# Patient Record
Sex: MALE | Race: WHITE | NOT HISPANIC OR LATINO | URBAN - METROPOLITAN AREA
[De-identification: names, ages, dates, MRNs, and addresses within clinical notes are randomized per-mention and may not be internally consistent; named-entity substitution may affect disease eponyms.]

---

## 2020-01-15 ENCOUNTER — INPATIENT (INPATIENT)
Facility: HOSPITAL | Age: 25
LOS: 1 days | DRG: 824 | End: 2020-01-17
Attending: INTERNAL MEDICINE | Admitting: INTERNAL MEDICINE
Payer: COMMERCIAL

## 2020-01-15 VITALS
RESPIRATION RATE: 20 BRPM | HEART RATE: 111 BPM | HEIGHT: 71 IN | TEMPERATURE: 98 F | OXYGEN SATURATION: 97 % | SYSTOLIC BLOOD PRESSURE: 143 MMHG | DIASTOLIC BLOOD PRESSURE: 92 MMHG | WEIGHT: 207.23 LBS

## 2020-01-15 DIAGNOSIS — J98.59 OTHER DISEASES OF MEDIASTINUM, NOT ELSEWHERE CLASSIFIED: ICD-10-CM

## 2020-01-15 DIAGNOSIS — Z29.9 ENCOUNTER FOR PROPHYLACTIC MEASURES, UNSPECIFIED: ICD-10-CM

## 2020-01-15 DIAGNOSIS — Z91.89 OTHER SPECIFIED PERSONAL RISK FACTORS, NOT ELSEWHERE CLASSIFIED: ICD-10-CM

## 2020-01-15 LAB
% ALBUMIN: 53.2 % — SIGNIFICANT CHANGE UP
% ALPHA 1: 5.7 % — SIGNIFICANT CHANGE UP
% ALPHA 2: 10.5 % — SIGNIFICANT CHANGE UP
% BETA: 13.1 % — SIGNIFICANT CHANGE UP
% GAMMA: 17.5 % — SIGNIFICANT CHANGE UP
ALBUMIN SERPL ELPH-MCNC: 4.1 G/DL — SIGNIFICANT CHANGE UP (ref 3.6–5.5)
ALBUMIN SERPL ELPH-MCNC: 4.5 G/DL — SIGNIFICANT CHANGE UP (ref 3.3–5)
ALBUMIN/GLOB SERPL ELPH: 1.1 RATIO — SIGNIFICANT CHANGE UP
ALP SERPL-CCNC: 118 U/L — SIGNIFICANT CHANGE UP (ref 40–120)
ALPHA1 GLOB SERPL ELPH-MCNC: 0.4 G/DL — SIGNIFICANT CHANGE UP (ref 0.1–0.4)
ALPHA2 GLOB SERPL ELPH-MCNC: 0.8 G/DL — SIGNIFICANT CHANGE UP (ref 0.5–1)
ALT FLD-CCNC: 56 U/L — HIGH (ref 10–45)
ANION GAP SERPL CALC-SCNC: 12 MMOL/L — SIGNIFICANT CHANGE UP (ref 5–17)
APPEARANCE UR: CLEAR — SIGNIFICANT CHANGE UP
AST SERPL-CCNC: 54 U/L — HIGH (ref 10–40)
B-GLOBULIN SERPL ELPH-MCNC: 1 G/DL — SIGNIFICANT CHANGE UP (ref 0.5–1)
BASOPHILS # BLD AUTO: 0 K/UL — SIGNIFICANT CHANGE UP (ref 0–0.2)
BASOPHILS NFR BLD AUTO: 0 % — SIGNIFICANT CHANGE UP (ref 0–2)
BILIRUB SERPL-MCNC: 0.3 MG/DL — SIGNIFICANT CHANGE UP (ref 0.2–1.2)
BILIRUB UR-MCNC: NEGATIVE — SIGNIFICANT CHANGE UP
BUN SERPL-MCNC: 13 MG/DL — SIGNIFICANT CHANGE UP (ref 7–23)
CALCIUM SERPL-MCNC: 9.8 MG/DL — SIGNIFICANT CHANGE UP (ref 8.4–10.5)
CHLORIDE SERPL-SCNC: 103 MMOL/L — SIGNIFICANT CHANGE UP (ref 96–108)
CO2 SERPL-SCNC: 27 MMOL/L — SIGNIFICANT CHANGE UP (ref 22–31)
COLOR SPEC: YELLOW — SIGNIFICANT CHANGE UP
CREAT SERPL-MCNC: 0.75 MG/DL — SIGNIFICANT CHANGE UP (ref 0.5–1.3)
CRP SERPL-MCNC: 0.58 MG/DL — HIGH (ref 0–0.4)
DIFF PNL FLD: NEGATIVE — SIGNIFICANT CHANGE UP
EOSINOPHIL # BLD AUTO: 0.09 K/UL — SIGNIFICANT CHANGE UP (ref 0–0.5)
EOSINOPHIL NFR BLD AUTO: 1.7 % — SIGNIFICANT CHANGE UP (ref 0–6)
ERYTHROCYTE [SEDIMENTATION RATE] IN BLOOD: 35 MM/HR — HIGH
FLU A RESULT: SIGNIFICANT CHANGE UP
FLU A RESULT: SIGNIFICANT CHANGE UP
FLUAV AG NPH QL: SIGNIFICANT CHANGE UP
FLUBV AG NPH QL: SIGNIFICANT CHANGE UP
GAMMA GLOBULIN: 1.3 G/DL — SIGNIFICANT CHANGE UP (ref 0.6–1.6)
GIANT PLATELETS BLD QL SMEAR: PRESENT — SIGNIFICANT CHANGE UP
GLUCOSE SERPL-MCNC: 96 MG/DL — SIGNIFICANT CHANGE UP (ref 70–99)
GLUCOSE UR QL: NEGATIVE — SIGNIFICANT CHANGE UP
HCT VFR BLD CALC: 43.9 % — SIGNIFICANT CHANGE UP (ref 39–50)
HGB BLD-MCNC: 14 G/DL — SIGNIFICANT CHANGE UP (ref 13–17)
KETONES UR-MCNC: NEGATIVE — SIGNIFICANT CHANGE UP
LEUKOCYTE ESTERASE UR-ACNC: NEGATIVE — SIGNIFICANT CHANGE UP
LYMPHOCYTES # BLD AUTO: 0.32 K/UL — LOW (ref 1–3.3)
LYMPHOCYTES # BLD AUTO: 6.1 % — LOW (ref 13–44)
MACROCYTES BLD QL: SLIGHT — SIGNIFICANT CHANGE UP
MANUAL SMEAR VERIFICATION: SIGNIFICANT CHANGE UP
MCHC RBC-ENTMCNC: 27.4 PG — SIGNIFICANT CHANGE UP (ref 27–34)
MCHC RBC-ENTMCNC: 31.9 GM/DL — LOW (ref 32–36)
MCV RBC AUTO: 85.9 FL — SIGNIFICANT CHANGE UP (ref 80–100)
MICROCYTES BLD QL: SLIGHT — SIGNIFICANT CHANGE UP
MONOCYTES # BLD AUTO: 0.28 K/UL — SIGNIFICANT CHANGE UP (ref 0–0.9)
MONOCYTES NFR BLD AUTO: 5.3 % — SIGNIFICANT CHANGE UP (ref 2–14)
NEUTROPHILS # BLD AUTO: 4.53 K/UL — SIGNIFICANT CHANGE UP (ref 1.8–7.4)
NEUTROPHILS NFR BLD AUTO: 86 % — HIGH (ref 43–77)
NITRITE UR-MCNC: NEGATIVE — SIGNIFICANT CHANGE UP
OVALOCYTES BLD QL SMEAR: SLIGHT — SIGNIFICANT CHANGE UP
PH UR: 6 — SIGNIFICANT CHANGE UP (ref 5–8)
PLAT MORPH BLD: ABNORMAL
PLATELET # BLD AUTO: 200 K/UL — SIGNIFICANT CHANGE UP (ref 150–400)
POTASSIUM SERPL-MCNC: 4.2 MMOL/L — SIGNIFICANT CHANGE UP (ref 3.5–5.3)
POTASSIUM SERPL-SCNC: 4.2 MMOL/L — SIGNIFICANT CHANGE UP (ref 3.5–5.3)
PROT PATTERN SERPL ELPH-IMP: SIGNIFICANT CHANGE UP
PROT SERPL-MCNC: 7.7 G/DL — SIGNIFICANT CHANGE UP (ref 6–8.3)
PROT SERPL-MCNC: 7.7 G/DL — SIGNIFICANT CHANGE UP (ref 6–8.3)
PROT SERPL-MCNC: 8.3 G/DL — SIGNIFICANT CHANGE UP (ref 6–8.3)
PROT UR-MCNC: NEGATIVE MG/DL — SIGNIFICANT CHANGE UP
RBC # BLD: 5.11 M/UL — SIGNIFICANT CHANGE UP (ref 4.2–5.8)
RBC # FLD: 12.7 % — SIGNIFICANT CHANGE UP (ref 10.3–14.5)
RBC BLD AUTO: ABNORMAL
RSV RESULT: SIGNIFICANT CHANGE UP
RSV RNA RESP QL NAA+PROBE: SIGNIFICANT CHANGE UP
SODIUM SERPL-SCNC: 142 MMOL/L — SIGNIFICANT CHANGE UP (ref 135–145)
SP GR SPEC: <=1.005 — SIGNIFICANT CHANGE UP (ref 1–1.03)
SPHEROCYTES BLD QL SMEAR: SLIGHT — SIGNIFICANT CHANGE UP
UROBILINOGEN FLD QL: 0.2 E.U./DL — SIGNIFICANT CHANGE UP
VARIANT LYMPHS # BLD: 0.9 % — SIGNIFICANT CHANGE UP (ref 0–6)
WBC # BLD: 5.27 K/UL — SIGNIFICANT CHANGE UP (ref 3.8–10.5)
WBC # FLD AUTO: 5.27 K/UL — SIGNIFICANT CHANGE UP (ref 3.8–10.5)

## 2020-01-15 PROCEDURE — 76942 ECHO GUIDE FOR BIOPSY: CPT | Mod: 26

## 2020-01-15 PROCEDURE — 74177 CT ABD & PELVIS W/CONTRAST: CPT | Mod: 26

## 2020-01-15 PROCEDURE — 88342 IMHCHEM/IMCYTCHM 1ST ANTB: CPT | Mod: 26

## 2020-01-15 PROCEDURE — 99285 EMERGENCY DEPT VISIT HI MDM: CPT

## 2020-01-15 PROCEDURE — 88173 CYTOPATH EVAL FNA REPORT: CPT | Mod: 26

## 2020-01-15 PROCEDURE — 38505 NEEDLE BIOPSY LYMPH NODES: CPT

## 2020-01-15 PROCEDURE — 71260 CT THORAX DX C+: CPT | Mod: 26

## 2020-01-15 PROCEDURE — 88341 IMHCHEM/IMCYTCHM EA ADD ANTB: CPT | Mod: 26

## 2020-01-15 PROCEDURE — 71046 X-RAY EXAM CHEST 2 VIEWS: CPT | Mod: 26

## 2020-01-15 PROCEDURE — 72126 CT NECK SPINE W/DYE: CPT | Mod: 26

## 2020-01-15 PROCEDURE — 93010 ELECTROCARDIOGRAM REPORT: CPT | Mod: 59

## 2020-01-15 PROCEDURE — 88305 TISSUE EXAM BY PATHOLOGIST: CPT | Mod: 26

## 2020-01-15 PROCEDURE — 72040 X-RAY EXAM NECK SPINE 2-3 VW: CPT | Mod: 26

## 2020-01-15 PROCEDURE — 88307 TISSUE EXAM BY PATHOLOGIST: CPT | Mod: 26

## 2020-01-15 PROCEDURE — 99222 1ST HOSP IP/OBS MODERATE 55: CPT

## 2020-01-15 RX ORDER — SODIUM CHLORIDE 9 MG/ML
3 INJECTION INTRAMUSCULAR; INTRAVENOUS; SUBCUTANEOUS ONCE
Refills: 0 | Status: COMPLETED | OUTPATIENT
Start: 2020-01-15 | End: 2020-01-15

## 2020-01-15 RX ORDER — ENOXAPARIN SODIUM 100 MG/ML
40 INJECTION SUBCUTANEOUS EVERY 24 HOURS
Refills: 0 | Status: DISCONTINUED | OUTPATIENT
Start: 2020-01-15 | End: 2020-01-17

## 2020-01-15 RX ORDER — IOHEXOL 300 MG/ML
30 INJECTION, SOLUTION INTRAVENOUS ONCE
Refills: 0 | Status: COMPLETED | OUTPATIENT
Start: 2020-01-15 | End: 2020-01-15

## 2020-01-15 RX ORDER — SODIUM CHLORIDE 9 MG/ML
1000 INJECTION INTRAMUSCULAR; INTRAVENOUS; SUBCUTANEOUS
Refills: 0 | Status: DISCONTINUED | OUTPATIENT
Start: 2020-01-15 | End: 2020-01-15

## 2020-01-15 RX ADMIN — IOHEXOL 30 MILLILITER(S): 300 INJECTION, SOLUTION INTRAVENOUS at 10:45

## 2020-01-15 RX ADMIN — ENOXAPARIN SODIUM 40 MILLIGRAM(S): 100 INJECTION SUBCUTANEOUS at 17:33

## 2020-01-15 RX ADMIN — SODIUM CHLORIDE 3 MILLILITER(S): 9 INJECTION INTRAMUSCULAR; INTRAVENOUS; SUBCUTANEOUS at 10:36

## 2020-01-15 RX ADMIN — SODIUM CHLORIDE 125 MILLILITER(S): 9 INJECTION INTRAMUSCULAR; INTRAVENOUS; SUBCUTANEOUS at 10:46

## 2020-01-15 NOTE — CONSULT NOTE ADULT - SUBJECTIVE AND OBJECTIVE BOX
24M sent in to ER for work up of neck symptoms. Pt reports 10 month history of vague, "muscle tightness" to his neck that has not improved. Pt says he has had the pain since April 2019 and been to 2-3 doctors for it in the past with no diagnosis. Symptoms include left arm pain and "numbness" that comes and goes at times. Recently pt has developed a low grade temp, dry cough, and hoarseness over the past 2-3 weeks which he assumed to be a cold/URI and these symptoms are not improving. Pt saw Dr. Langley for his continued neck pain last week and was sent for MRI that revealed MRI of the cervical spine which was consistent with malignant infiltration or inhomogeneous Infiltration of the bones of the cervical spine.    PE: Comfortable, sitting in ER chair.  +Fullness to cervical spine left > greater than right with lymphadenopathy. Decreased ROM to cervical spine secondary to pain.AIN/PIN/U nerves intact to motor BUE. Wrist flex/ext intact BUE. Sensation intact to M/R/U/Msk/Ax nerves and equal BUE. Cap refill brisk. Skin warm and well perfused. Radial pulses palpable.     Labs pending  CT Chest/Abd/Pelvis/Neck with PO and IV contrast pending  MRI from outpatient being uploaded  CXR, Cervical spine XR show mediastinal mass, for further eval    A/P: 24M presenting with 10 months of neck pain accompanied with neck mass, and 3 week hx of hoarsness and dry cough   - Medical concern for malignancy- Pt being admitted to medicine for further Med/Onc work up  - Discussed with Ortho Spine Attg Dr. Langley, will follow patient and review studies as they return  - CT, Electrophoresis studies, MRI upload pending.  - Ortho [phone]

## 2020-01-15 NOTE — ED PROVIDER NOTE - CLINICAL SUMMARY MEDICAL DECISION MAKING FREE TEXT BOX
23 yo M with no pmhx who presents for further evaluation following MRI yesterday. Pt is hemodynamically stable, with mild tachycardia. L supraclavicular mass appreciated, firm, nontender. Hoarseness and dry cough present for the past few weeks as well. Will admit for further work up.  - CBC, CMP, CRP, ESR, SPEP   - CT A/P with oral and IV contrast, CT chest with IV contrast, CT cervical spine with IV contrast  - chest xray, cervical spine xray

## 2020-01-15 NOTE — H&P ADULT - HISTORY OF PRESENT ILLNESS
24M with no PMH presents with neck pain which started last sprain. The pain went away but returned this fall  in which he also developed hoarseness.  The patient was well until last spring when he developed neck pain which he initially attributed to sleeping the wrong way at night on an uncomfortable pillow.  The symptoms partially abated but by the fall the patient had developed an episode of hoarseness which lasted approximately 2 days.  Since Christmas holidays the patient for the last 2 weeks has been hoarse And this had limited range of motion and discomfort in the cervical spine. The patient also developed a dry cough since that time.  The patient went to see an orthopedic surgeon who ordered an MRI of the cervical spine which was consistent with malignant infiltration or at least Inhomogeneous Infiltration of the bones of the cervical spine.  The patient was recommended to go this morning to the emergency room.  There was no evidence for cord compression on the cervical spine MRI.    The patient has been a healthy person all his life although he admits to having problems with being overweight.  The patient has recently lost weight which was felt to be intentional.  His family history is noncontributory.    The patient's vital signs are within normal limits.  Examination of the head ears eyes nose and throat demonstrates no abnormality.  Examination of the neck reveals lymphedema of the anterior lower neck and palpable 2 x 3 cm lymphadenopathy in the left supraclavicular area. The lung sounds are coarse in the left lung field although there are no forced expiratory wheezes bilaterally.  The heart rate is regular with no auscultatory evidence for murmur, rub or gallop.  The patient has excessive truncal obesity but there is no palpable or percussible hepatomegaly or splenomegaly.  The patient has no infraclavicular, axillary, epitrochlear, periumbilical or inguinal lymphadenopathy.  The patient has no chronic venous stasis changes in the lower extremities.  Neurologically there is no gross abnormality and higher cortical, cerebellar, motor or sensory functions.  The patient has no rash. 24M with no PMH presenting with 10 months of neck/left shoulder pain accompanied with neck mass, and 3 week history of hoarseness and dry cough. He says that the pain is dull and worsens on change of position. It starts at the neck and spreads down to his left shoulder. He denies any numbness, tingling, or loss of strength and sensation in his arm and hand. He also notes chest tightness especially over his left upper chest. He rates the pain as 6/10 in severity and says it comes and goes and sometimes keeps him up at night. He has seen multiple doctors for his symptoms including orthopedics and his primary care. His symptoms were controlled by muscle relaxers as well as aleve, Advil and Tylenol which he notes that he has been taking one of each daily in the last 3 weeks. He reports that it helps alleviate his pain for a short time. His hoarseness and cough which started a few weeks ago was attributed to a URI but his symptoms didn't resolve. He says his cough has become less frequent and his hoarseness still persists. He denies any dysphagia or odynophagia.  The patient went to see an orthopedic surgeon last week who ordered an MRI of the cervical spine which was consistent with malignant infiltration and homogeneous Infiltration of the bones of the cervical spine with no evidence of cord compression. He has family history of malignancies in his paternal grandmother and aunt with origins he cannot recall. In the past few weeks, he reports low grade fevers, intermittent chills and night sweats with no change in appetite or unintentional weight loss.     ED Course:  Vitals: T 97.9F  /92 RR 20 O2 98% RA  Labs: Wbc 5.27 Hb 14.0 Plt 200 AST 54 ALT 56 ESR 35 CRP 0.58 Flu neg, RSV neg  CXR Sclerosis of the C7 vertebral body with irregularity of the superior endplate, left upper lobe pulmonary nodule and fullness in the left hilar region  Given 1L NS in the ED 24M with no PMH presenting with 10 months of neck/left shoulder pain accompanied with neck mass, and 3 week history of hoarseness and dry cough. He says that the pain is dull and worsens on change of position. It starts at the neck and spreads down to his left shoulder. He denies any numbness, tingling, or loss of strength and sensation in his arm and hand. He also notes chest tightness especially over his left upper chest. He rates the pain as 6/10 in severity and says it comes and goes and sometimes keeps him up at night. He has seen multiple doctors for his symptoms including orthopedics and his primary care. His symptoms were controlled by muscle relaxers as well as aleve, Advil and Tylenol which he notes that he has been taking one of each daily in the last 3 weeks. He reports that it helps alleviate his pain for a short time. His hoarseness and cough which started a few weeks ago was attributed to a URI but his symptoms didn't resolve. He says his cough has become less frequent and his hoarseness still persists. He denies any dysphagia or odynophagia.  The patient went to see an orthopedic surgeon last week who ordered an MRI of the cervical spine which was consistent with malignant infiltration and homogeneous Infiltration of the bones of the cervical spine with no evidence of cord compression. He has family history of malignancies in his paternal grandmother and aunt with origins he cannot recall. In the past few weeks, he reports low grade fevers, intermittent chills and night sweats with no change in appetite or unintentional weight loss.     ED Course:  Vitals: T 97.9F  /92 RR 20 O2 98% RA  Labs: Wbc 5.27 Hb 14.0 Plt 200 AST 54 ALT 56 ESR 35 CRP 0.58 Flu neg, RSV neg  CXR Sclerosis of the C7 vertebral body with irregularity of the superior endplate, left upper lobe pulmonary nodule and fullness in the left hilar region  CT A/P: Destructive and presumed metastatic lesion of C7 with mild height loss. Marrow involvement of both C7 and T1 with ventral cortical erosion of C7-T2 from prevertebral tumor. Epidural and neural foraminal extension of tumor with partial encasement of the left vertebral artery. Multiple liver lesions. Enlarged portacaval, periportal and upper retroperitoneal nodes. Malignant tumor noted in the anterior mediastinum. Trace pericardial effusion. Metastatic left supraclavicular adenopathy. Bony metastases.  Given 1L NS in the ED

## 2020-01-15 NOTE — CONSULT NOTE ADULT - ASSESSMENT
The patient likely has a malignant process and with the auscultatory changes in the left chest, hoarseness and infiltrative cervical spine disease I suspect the patient has primary lung cancer, thymoma, Hodgkin's disease when on the skin's lymphoma.

## 2020-01-15 NOTE — ED CLERICAL - NS ED CLERK NOTE PRE-ARRIVAL INFORMATION; ADDITIONAL PRE-ARRIVAL INFORMATION
00 M- Low,  Guillermo Nichols Pt of Dr. Ba coming to the ER with Bone dz - leuk- need chest ct Abd & Pelvic -serum electro-labs Upload MRI to PACS. Admit to Dr. Ba . Call Ortho when patient gets here Dr. Talley (MINERVA OROZCO)

## 2020-01-15 NOTE — CONSULT NOTE ADULT - ATTENDING COMMENTS
The patient should have a cancer blood panel consisting of a CEA antigen, alpha-fetoprotein, beta subunit hCG, .  CT scan of the chest abdomen and pelvis with oral and IV contrast.  Ultrasound directed biopsy of the left supraclavicular lymph node by interventional radiology.

## 2020-01-15 NOTE — CONSULT NOTE ADULT - SUBJECTIVE AND OBJECTIVE BOX
The patient was well until last spring when he developed neck pain which he initially attributed to sleeping the wrong way at night on an uncomfortable pillow.  The symptoms partially abated but by the fall the patient had developed an episode of hoarseness which lasted approximately 2 days.  Since Christmas holidays the patient for the last 2 weeks has been hoarse And this had limited range of motion and discomfort in the cervical spine. The patient also developed a dry cough since that time.  The patient went to see an orthopedic surgeon who ordered an MRI of the cervical spine which was consistent with malignant infiltration or at least Inhomogeneous Infiltration of the bones of the cervical spine.  The patient was recommended to go this morning to the emergency room.  There was no evidence for cord compression on the cervical spine MRI.    The patient has been a healthy person all his life although he admits to having problems with being overweight.  The patient has recently lost weight which was felt to be intentional.  His family history is noncontributory.    The patient's vital signs are within normal limits.  Examination of the head ears eyes nose and throat demonstrates no abnormality.  Examination of the neck reveals lymphedema of the anterior lower neck and palpable 2 x 3 cm lymphadenopathy in the left supraclavicular area. The lung sounds are coarse in the left lung field although there are no forced expiratory wheezes bilaterally.  The heart rate is regular with no auscultatory evidence for murmur, rub or gallop.  The patient has excessive truncal obesity but there is no palpable or percussible hepatomegaly or splenomegaly.  The patient has no infraclavicular, axillary, epitrochlear, periumbilical or inguinal lymphadenopathy.  The patient has no chronic venous stasis changes in the lower extremities.  Neurologically there is no gross abnormality and higher cortical, cerebellar, motor or sensory functions.  The patient has no rash.

## 2020-01-15 NOTE — H&P ADULT - PROBLEM SELECTOR PLAN 1
auscultatory changes in the left chest, hoarseness and infiltrative cervical spine disease   Differentials include primary lung cancer, thymoma, Hodgkin's disease  CT scan of the chest abdomen and pelvis with oral and IV contrast.  Ultrasound directed biopsy of the left supraclavicular lymph node by interventional radiology.   CEA antigen, alpha-fetoprotein, beta subunit hCG, . 10 month history of neck and left shoulder pain, now with new onset dry cough a hoarseness. Neck reveals lymphedema of the anterior lower neck and palpable 2 x 3 cm lymphadenopathy in the left supraclavicular area. Lungs clear to auscultation. Infiltrative cervical spine disease seen on MRI (1/13/2020) and CXR. Differentials include primary lung cancer, thymoma, Hodgkin's disease  - f/u CT scan of the chest abdomen and pelvis with oral and IV contrast.  - pending ultrasound directed biopsy of the left supraclavicular lymph node by interventional radiology.   - f/u CEA antigen, alpha-fetoprotein, beta subunit hCG, .  - followed by Dr Ba 10 month history of neck and left shoulder pain, now with new onset dry cough a hoarseness. Neck reveals lymphedema of the anterior lower neck and palpable 2 x 3 cm lymphadenopathy in the left supraclavicular area. Lungs clear to auscultation. Infiltrative cervical spine disease seen on MRI (1/13/2020) and CXR. Reports low grade fevers, chills, and night sweats intermittently within the last few weeks. Denied loss of appetite or weight loss. Has family history of malignancy. Differentials include primary lung cancer, thymoma, Hodgkin's disease  - f/u CT scan of the chest abdomen and pelvis with oral and IV contrast.  - pending ultrasound directed biopsy of the left supraclavicular lymph node by interventional radiology.   - f/u CEA antigen, alpha-fetoprotein, beta subunit hCG, .  - followed by Dr Ba 10 month history of neck and left shoulder pain, now with new onset dry cough a hoarseness. Neck reveals lymphedema of the anterior lower neck and palpable 2 x 3 cm lymphadenopathy in the left supraclavicular area. Lungs clear to auscultation. Infiltrative cervical spine disease seen on MRI (1/13/2020) and CXR. Reports low grade fevers, chills, and night sweats intermittently within the last few weeks. Denied loss of appetite or weight loss. Has family history of malignancy. Differentials include primary lung cancer, thymoma, Hodgkin's disease  - CT A/P: Destructive and presumed metastatic lesion of C7 with mild height loss. Marrow involvement of both C7 and T1 with ventral cortical erosion of C7-T2 from prevertebral tumor. Epidural and neural foraminal extension of tumor with partial encasement of the left vertebral artery. Multiple liver lesions. Enlarged portacaval, periportal and upper retroperitoneal nodes. Malignant tumor noted in the anterior mediastinum. Trace pericardial effusion. Metastatic left supraclavicular adenopathy. Bony metastases.  - pending ultrasound directed biopsy of the left supraclavicular lymph node by interventional radiology.   - f/u CEA antigen, alpha-fetoprotein, beta subunit hCG, .  - followed by Dr Ba

## 2020-01-15 NOTE — ED PROVIDER NOTE - NS_EDPROVIDERDISPOUSERTYPE_ED_A_ED
Medical Students Attestation (For Medical Student USE Only).../Attending Attestation (For Attendings USE Only)...

## 2020-01-15 NOTE — ED PROVIDER NOTE - OBJECTIVE STATEMENT
25 yo M with no phmx who presents for further medical evaluation of a 9 mo hx of neck and back pain. Pt reports he had seen orthopedics, who sent him for an MRI, was subsequently told to come to the ED for further work up based on MRI findings. Reports that pain at this time is 0/10. Throughout this time has been worse 23 yo M with no phmx who presents for further medical evaluation of a 9 mo hx of neck and back pain. Pt reports he had seen orthopedics, who sent him for an MRI, was subsequently told to come to the ED for further work up based on MRI findings. Reports that pain at this time is 0/10. Throughout this time has been worse following prolonged sitting, improved with lying down, heat, NSAIDs. Occasional radiation to L arm. Endorses low grade fevers over the past few weeks, reports mild night sweats which has required him to change his sheets a "few" times over the past few weeks. For the past 2 weeks has had a non-productive cough. Denies any weight changes, chills, CP, SOB.

## 2020-01-15 NOTE — CONSULT NOTE ADULT - ASSESSMENT
Assessment/Plan:  24yM no PMH presenting with 3 weeks of hoarseness in setting of several months of neck pain with exam significant for immobile L TVF, currently admitted for metastatic workup.    - Please obtain CT neck with IV contrast (from aortic arch to skull base, to eval course of recurrent laryngeal nerve)  - ENT will f/u CT neck   - Remainder of care per primary team    [facility] at [phone] with any questions/concerns.

## 2020-01-15 NOTE — ED ADULT TRIAGE NOTE - CHIEF COMPLAINT QUOTE
pt c/o chronic neck and back pain referred by orthopedics for admission. pt reports having an MRI done and " will need further testing" denies numbness, tingling, bowel or urinary incontinence.

## 2020-01-15 NOTE — H&P ADULT - ASSESSMENT
24M with no PMH presenting with 10 months of neck/left shoulder pain accompanied with a neck mass, and 3 week history of hoarseness and dry cough admitted for malignancy workup.

## 2020-01-15 NOTE — H&P ADULT - NSHPLABSRESULTS_GEN_ALL_CORE
LABS:                         14.0   5.27  )-----------( 200      ( 15 Anselmo 2020 10:35 )             43.9     01-15    142  |  103  |  13  ----------------------------<  96  4.2   |  27  |  0.75    Ca    9.8      15 Anselmo 2020 10:35    TPro  8.3  /  Alb  4.5  /  TBili  0.3  /  DBili  x   /  AST  54<H>  /  ALT  56<H>  /  AlkPhos  118  01-15                  RADIOLOGY, EKG & ADDITIONAL TESTS:

## 2020-01-15 NOTE — ED PROVIDER NOTE - ENMT, MLM
Airway patent, Mouth with normal mucosa. Throat has no vesicles, no oropharyngeal exudates and uvula is midline. Hoarse voice.   Firm, painless mass appreciated L supraclavicular.

## 2020-01-15 NOTE — H&P ADULT - NSHPPHYSICALEXAM_GEN_ALL_CORE
Vital Signs Last 12 Hrs  T(F): 97.9 (01-15-20 @ 09:07), Max: 97.9 (01-15-20 @ 09:07)  HR: 111 (01-15-20 @ 09:07) (111 - 111)  BP: 143/92 (01-15-20 @ 09:07) (143/92 - 143/92)  BP(mean): --  RR: 20 (01-15-20 @ 09:07) (20 - 20)  SpO2: 97% (01-15-20 @ 09:07) (97% - 97%)    PHYSICAL EXAM:  Constitutional: NAD, comfortable in bed.  HEENT: NC/AT, PERRLA, EOMI, no conjunctival pallor or scleral icterus, MMM  Neck: Supple, no JVD  Respiratory: CTA B/L. No w/r/r.   Cardiovascular: RRR, normal S1 and S2, no m/r/g.   Gastrointestinal: +BS, soft NTND, no guarding or rebound tenderness, no palpable masses   Extremities: wwp; no cyanosis, clubbing or edema.   Vascular: Pulses equal and strong throughout.   Neurological: AAOx3, no CN deficits, strength and sensation intact throughout.   Skin: No gross skin abnormalities or rashes Vital Signs Last 12 Hrs  T(F): 97.9 (01-15-20 @ 09:07), Max: 97.9 (01-15-20 @ 09:07)  HR: 111 (01-15-20 @ 09:07) (111 - 111)  BP: 143/92 (01-15-20 @ 09:07) (143/92 - 143/92)  BP(mean): --  RR: 20 (01-15-20 @ 09:07) (20 - 20)  SpO2: 97% (01-15-20 @ 09:07) (97% - 97%)    PHYSICAL EXAM:  Constitutional: NAD, comfortable in bed.  HEENT: NC/AT, PERRLA, EOMI, no conjunctival pallor or scleral icterus, MMM  Neck: 2 x 3 cm lymphadenopathy in the left supraclavicular area with no pain on palpation, no JVD  Respiratory: CTA B/L. No w/r/r.   Cardiovascular: RRR, normal S1 and S2, no m/r/g.   Gastrointestinal: +BS, soft NTND, no guarding or rebound tenderness, no palpable masses   Extremities: pain on palpation on the left neck and left shoulder, wwp; no cyanosis, clubbing or edema.   Vascular: Pulses equal and strong throughout.   Neurological: AAOx3, no CN deficits, strength and sensation intact throughout.   MSK:  hypertonic muscle in the left paravertebral region and intercostal on the left anterior chest  Skin: increased redness on the upper chest bilaterally, No gross skin abnormalities or rashes

## 2020-01-15 NOTE — CONSULT NOTE ADULT - NSHPATTENDINGPLANDISCUSS_GEN_ALL_CORE
The patient, the emergency room physicians In the person and orthopedic surgery By telephone and text message.

## 2020-01-15 NOTE — CONSULT NOTE ADULT - SUBJECTIVE AND OBJECTIVE BOX
HPI:   24M with no PMH who presents with 10 months of neck/left shoulder pain accompanied with neck mass, and 3 week history of hoarseness and dry cough. Patient had outpatient MRI which revealed bony lesions suspicious for metastatic disease. In the ED, patient had CXR with mediastinal mass and CT chest c/w malignant tumor in anterior mediastinum, L supraclavicular adenopathy, and bony metastasis. CT ab/pelvis significant for multiple liver lesions. ENT consulted to evaluate hoarseness. Patient reports hoarseness began 3 weeks ago and was associated with dry cough but no drooling or SOB.  +Subjective fevers/chills, +vomiting x1.        Allergies    dairy products (Anaphylaxis)  No Known Drug Allergies    Intolerances        PAST MEDICAL & SURGICAL HISTORY:  No pertinent past medical history  No significant past surgical history      SOCIAL HISTORY:  Tobacco History: Never  ETOH Use: social       FAMILY HISTORY:  Salivary gland malignancy in paternal aunt   Unknown cancer in paternal grandmother     REVIEW OF SYSTEMS: as per HPI    MEDICATIONS:  Antiinfectives:       Hematologic/Anticoagulation:  enoxaparin Injectable 40 milliGRAM(s) SubCutaneous every 24 hours      Pain medications/Neuro:      IV fluids:      Endocrine Medications:       All other standing medications:       All other PRN medications:      Vital Signs Last 24 Hrs  T(C): 36.9 (15 Anselmo 2020 17:26), Max: 36.9 (15 Anselmo 2020 17:26)  T(F): 98.5 (15 Anselmo 2020 17:26), Max: 98.5 (15 Anselmo 2020 17:26)  HR: 88 (15 Anselmo 2020 17:26) (76 - 111)  BP: 124/80 (15 Anselmo 2020 17:26) (124/80 - 143/92)  BP(mean): --  RR: 18 (15 Anselmo 2020 17:26) (18 - 20)  SpO2: 100% (15 Anselmo 2020 17:26) (97% - 100%)    LABS:  CBC-    01-15    142  |  103  |  13  ----------------------------<  96  4.2   |  27  |  0.75    Ca    9.8      15 Anselmo 2020 10:35    TPro  7.7  /  Alb  x   /  TBili  x   /  DBili  x   /  AST  x   /  ALT  x   /  AlkPhos  x   01-15    Coagulation Studies-    Endocrine Panel-  --  --  9.8 mg/dL        PHYSICAL EXAM:    ENT EXAM-   Constitutional: Well-developed, well-nourished.  Hoarse voice.     Head:  normocephalic, atraumatic.   Nose:  Septum intact. Inferior turbinates normal bilateral    MULTISYSTEM EXAM-  Neuro/Psych:  A&O x 3.  Mood stable.    Eyes:  EOMI  Pulm:  No dyspnea, non-labored breathing  Cardiovascular: NSR on monitors   Skin:  No rash or lesions on exposed skin of head/neck      Laryngoscopy Findings:   LARYNGOSCOPY EXAM:     -Verbal consent was obtained from patient prior to procedure.    Indication:    Anesthesia: Afrin spray was applied to the nasal cavities.    Flexible laryngoscopy was performed and revealed the following:    -- Nasopharynx had no mass or exudate.    -- Base of tongue was symmetric and not enlarged.    -- Vallecula was clear    -- Epiglottis, both aryepiglottic folds and both false vocal folds were normal    -- Arytenoids both without edema and erythema     -- L true vocal fold immobile. R TVF mobile.     -- Post cricoid area was clear.    -- Interarytenoid edema was absent    The patient tolerated the procedure well.      RADIOLOGY & ADDITIONAL STUDIES:      EXAM:  CT ABDOMEN AND PELVIS OC IC                          PROCEDURE DATE:  01/15/2020          INTERPRETATION:  CT of the ABDOMEN and PELVIS with intravenous contrast dated 1/15/2020 12:42 PM    INDICATION: bone cancer needs met eval     TECHNIQUE: CT of the abdomen and pelvis was performed. Axial and coronal  and sagittall images were produced and reviewed.    CONTRAST USAGE:  IV contrast: Optiray 350: 100 ml administered; ml discarded.  Oral contrast: administered.    PRIOR STUDIES: None.    FINDINGS: Images of the lower chest demonstrate trace pericardial effusion.    The liver is normal in size. Multiple liver inhomogeneous liver lesions including 2.6 x 2.2 cm segment two, coalescent lesion measuring 4.8 x 3.6 cm segment six. There are other smaller lesions in the right lobe measuring 1.9 x 2.1 cm, 2.0 x 1.1 cm, 1.2 x 1.0 cm..  No radiopaque stones are seen in the gallbladder.  The pancreas is normal in appearance.  No splenic abnormalities are seen. Enlarged inhomogeneous periportal and portacaval nodes up to 1.9 cm diameter.    The adrenal glands are unremarkable. The kidneys are normal in appearance.        No abdominal aortic aneurysm is seen. Slightly enlarged high retroperitoneal nodes including  paraaortic, interaortocaval , preaortic. Small mesenteric nodes.    Evaluation of the bowel demonstrates that the ingested oral contrast material has reached the hepatic flexure of colon. No bowel obstruction. Normal appendix.. No ascites is seen.    Images of the pelvis demonstrate the prostate and seminal vesicles to be normal in appearance. The prostate measures 3.5 x 2.5 x 2.3 cm. The bladder is distended but otherwise normal.    Evaluation of the osseous structures demonstrates multiple mixed osteolytic and osteoblastic bone lesions including right ischial tuberosity, right sacral a lower, right posterior ilium, left posterior iliac bone left posterior 11th rib associated with an extrapleural mass.      IMPRESSION: Multiple liver lesions are likely metastases versus lymphoma.  Enlarged portacaval, periportal and upper retroperitoneal nodes.      EXAM:  CT CHEST IC                          PROCEDURE DATE:  01/15/2020          INTERPRETATION:  CT of the CHEST WITH INTRAVENOUS CONTRAST DATED : 1/15/2020 9:19 AM    INDICATION: bone cancer  needs met eval    TECHNIQUE: CT of the chest was performed. Axial and coronal and sagittal  images were produced and reviewed.    CONTRAST USAGE:  IV contrast: Optiray 350: 100 ml administered; ml discarded.      PRIOR STUDIES: None.    FINDINGS: The heart is normal in size.  Trace pericardial effusion is seen.  The great vessels are unremarkable.  No mediastinal  or axillary  lymphadenopathy is seen. 0.9 cm right hilar node. There is a large inhomogeneous solid anterior mediastinal mass measuring 6.6 x 8.4 x 8.1 cm. Superior extension noted with encasement of the left common carotid artery and left subclavian artery. The thyroid gland is unremarkable. Enlarged left supraclavicular inhomogeneous nodes measuring 1.7 x 2.2 x 2.9 cm. Left internal jugular vein not identified. There is occlusion and encasement of the left brachiocephalic vein and probably the proximal left subclavian vein.     Evaluation of the pulmonary parenchyma demonstrates no abnormality.  No pleural effusions are seen.    Evaluation of the osseous structures multiple mixed osteolytic osteoblastic blastic lesions. These include manubrium of sternum associated with periosteal reaction and extra osseous extension, C7 vertebral body, left posterior 11th rib.   Bilateral gynecomastia.      IMPRESSION: Malignant tumor noted in the anterior mediastinum. Differential diagnosis would include lymphoma, malignant germ cell tumor. Trace pericardial effusion. Metastatic left supraclavicular adenopathy. Bony metastases as described above. Also recommend scrotal ultrasound for evaluation of testes.    EXAM:  CT CERVICAL SPINE IC                          PROCEDURE DATE:  01/15/2020          INTERPRETATION:  EXAM:  CT Cervical Spine with Contrast    INDICATION: Bone cancer. Evaluate for metastases.    TECHNIQUE: Multiple axial sections were obtained from the mid orbits to the sternoclavicular joint. Intravenous contrast material was utilized. Sagittal and coronal reformats were obtained from the axial data set. The images were reviewed in soft tissue and bone windows.    CONTRAST: 93 cc Optiray 350    COMPARISON: Outside noncontrast MRI cervical spine 01/13/2020    FINDINGS:     There is pathologic mild compression deformity of the C7 vertebral body that appears heterogeneously sclerotic and lytic on CT with involvement of the posterior elements, especially the left transverse process. There is also slight height loss of the T1 vertebral body with marrow replacement better seen on MR, without additional focus of galen lytic or blastic change on CT in the T1 vertebral body or elsewhere. However, there is clear ventral cortical erosion of C7, T1 and T2. MRI shows prevertebral tumor here and also present in the ventral epidural space, and neural foramina especially on the left with partial encasement of the left vertebral artery. No compression seen on outside MR. Soft tissue assessment on CT is hampered in the presence of broad shoulder body habitus with excessive image noise.     Alignment of the cervical spine shows straightening of lordosis. There is no spondylolisthesis or facet subluxation. Alignment at the craniovertebral junction is similarly normal. Intervertebral discs are preserved in height.    There is partially necrotic lymphadenopathy at the left level IV station and smaller there are asymmetric lymph nodes at left levels level 2 and 3. As more completely evaluated on chest CT, there is a mediastinal mass with great vessel encasement. There is also evidence of left vocal palsy indicating invasion of the left recurrent laryngeal nerve. Destructive mass of the manubrium is partially imaged.      IMPRESSION:    Destructive and presumed metastatic lesion of C7 with mild height loss. Marrow involvement of both C7 and T1 with ventral cortical erosion of C7-T2 from prevertebral tumor seen on outside MR. There is also epidural and neural foraminal extension of tumor with partial encasement of the left vertebral artery. Extraosseous tumor would be better defined with contrast-enhanced MR and/or PET-CT.    No malalignment.    Extraspinal malignancy as seen on same day chest CT, with additional note of left cervical lymphadenopathy and evidence of left vocal palsy.

## 2020-01-15 NOTE — PROGRESS NOTE ADULT - SUBJECTIVE AND OBJECTIVE BOX
PUD FOR DR SOUZA    24M with no PMH presenting with 10 months of neck/left shoulder pain accompanied with neck mass, and 3 week history of hoarseness and dry cough. He says that the pain is dull and worsens on change of position. It starts at the neck and spreads down to his left shoulder. He denies any numbness, tingling, or loss of strength and sensation in his arm and hand. He also notes chest tightness especially over his left upper chest. He rates the pain as 6/10 in severity and says it comes and goes and sometimes keeps him up at night. He has seen multiple doctors for his symptoms including orthopedics and his primary care. His symptoms were controlled by muscle relaxers as well as aleve, Advil and Tylenol which he notes that he has been taking one of each daily in the last 3 weeks. He reports that it helps alleviate his pain for a short time. His hoarseness and cough which started a few weeks ago was attributed to a URI but his symptoms didn't resolve. He says his cough has become less frequent and his hoarseness still persists. He denies any dysphagia or odynophagia.  The patient went to see an orthopedic surgeon last week who ordered an MRI of the cervical spine which was consistent with malignant infiltration and homogeneous Infiltration of the bones of the cervical spine with no evidence of cord compression. He has family history of malignancies in his paternal grandmother and aunt with origins he cannot recall. In the past few weeks, he reports low grade fevers, intermittent chills and night sweats with no change in appetite or unintentional weight loss.     ED Course:  Vitals: T 97.9F  /92 RR 20 O2 98% RA  Labs: Wbc 5.27 Hb 14.0 Plt 200 AST 54 ALT 56 ESR 35 CRP 0.58 Flu neg, RSV neg  CXR Sclerosis of the C7 vertebral body with irregularity of the superior endplate, left upper lobe pulmonary nodule and fullness in the left hilar region  CT A/P: Destructive and presumed metastatic lesion of C7 with mild height loss. Marrow involvement of both C7 and T1 with ventral cortical erosion of C7-T2 from prevertebral tumor. Epidural and neural foraminal extension of tumor with partial encasement of the left vertebral artery. Multiple liver lesions. Enlarged portacaval, periportal and upper retroperitoneal nodes. Malignant tumor noted in the anterior mediastinum. Trace pericardial effusion. Metastatic left supraclavicular adenopathy. Bony metastases.  Given 1L NS in the ED.       Review of Systems:  Other Review of Systems: All other review of systems negative, except as noted in HPI	      Allergies and Intolerances:        Allergies:  	No Known Drug Allergies:   	dairy products: Food, Anaphylaxis    Home Medications:   * Outpatient Medication Status not yet specified    .    Patient History:    Past Medical, Past Surgical, and Family History:  PAST MEDICAL HISTORY:  No pertinent past medical history.     PAST SURGICAL HISTORY:  No significant past surgical history.     Social History:  Social History (marital status, living situation, occupation, tobacco use, alcohol and drug use, and sexual history): Denies smoking, alcohol, drug use. Lives at home with his parents.	     Tobacco Screening:  · Core Measure Site	No	  · Has the patient used tobacco in the past 30 days?	No	    Risk Assessment:    Present on Admission:  Deep Venous Thrombosis	no	  Pulmonary Embolus	no	     Heart Failure:  Does this patient have a history of or has been diagnosed with heart failure? no.    HIV Screen (per [facility], HIV screening must be offered to every individual between ages 13 and 64)	Unable to offer due to clinical condition	       Physical Exam:  Physical Exam: Vital Signs Last 12 Hrs  T(F): 97.9 (01-15-20 @ 09:07), Max: 97.9 (01-15-20 @ 09:07)  HR: 111 (01-15-20 @ 09:07) (111 - 111)  BP: 143/92 (01-15-20 @ 09:07) (143/92 - 143/92)  BP(mean): --  RR: 20 (01-15-20 @ 09:07) (20 - 20)  SpO2: 97% (01-15-20 @ 09:07) (97% - 97%)   PHYSICAL EXAM:  Constitutional: NAD, comfortable in bed.  HEENT: NC/AT, PERRLA, EOMI, no conjunctival pallor or scleral icterus, MMM  Neck: 2 x 3 cm lymphadenopathy in the left supraclavicular area with no pain on palpation, no JVD  Respiratory: CTA B/L. No w/r/r.   Cardiovascular: RRR, normal S1 and S2, no m/r/g.   Gastrointestinal: +BS, soft NTND, no guarding or rebound tenderness, no palpable masses   Extremities: pain on palpation on the left neck and left shoulder, wwp; no cyanosis, clubbing or edema.   Vascular: Pulses equal and strong throughout.   Neurological: AAOx3, no CN deficits, strength and sensation intact throughout.   MSK:  hypertonic muscle in the left paravertebral region and intercostal on the left anterior chest Skin: increased redness on the upper chest bilaterally, No gross skin abnormalities or rashes	       Labs and Results:  Labs, Radiology, Cardiology, and Other Results: LABS:                         14.0   5.27  )-----------( 200      ( 15 Anselmo 2020 10:35 )             43.9    01-15   142  |  103  |  13  ----------------------------<  96  4.2   |  27  |  0.75   Ca    9.8      15 Anselmo 2020 10:35   TPro  8.3  /  Alb  4.5  /  TBili  0.3  /  DBili  x   /  AST  54<H>  /  ALT  56<H>  /  AlkPhos  118  01-15  RADIOLOGY, EKG & ADDITIONAL TESTS:  INTERPRETATION:  CT of the CHEST WITH INTRAVENOUS CONTRAST DATED : 1/15/2020 9:19 AM  INDICATION: bone cancer  needs met eval  TECHNIQUE: CT of the chest was performed. Axial and coronal and sagittal  images were produced and reviewed.  CONTRAST USAGE: IV contrast: Optiray 350: 100 ml administered; ml discarded.   PRIOR STUDIES: None.  FINDINGS: The heart is normal in size.  Trace pericardial effusion is seen.  The great vessels are unremarkable.  No mediastinal  or axillary  lymphadenopathy is seen. 0.9 cm right hilar node. There is a large inhomogeneous solid anterior mediastinal mass measuring 6.6 x 8.4 x 8.1 cm. Superior extension noted with encasement of the left common carotid artery and left subclavian artery. The thyroid gland is unremarkable. Enlarged left supraclavicular inhomogeneous nodes measuring 1.7 x 2.2 x 2.9 cm. Left internal jugular vein not identified. There is occlusion and encasement of the left brachiocephalic vein and probably the proximal left subclavian vein.   Evaluation of the pulmonary parenchyma demonstrates no abnormality.  No pleural effusions are seen.  Evaluation of the osseous structures multiple mixed osteolytic osteoblastic blastic lesions. These include manubrium of sternum associated with periosteal reaction and extra osseous extension, C7 vertebral body, left posterior 11th rib.  Bilateral gynecomastia.   IMPRESSION: Malignant tumor noted in the anterior mediastinum. Differential diagnosis would include lymphoma, malignant germ cell tumor. Trace pericardial effusion. Metastatic left supraclavicular adenopathy. Bony metastases as described above. Also recommend scrotal ultrasound for evaluation of testes.	    Assessment and Plan:    Assessment:  · Assessment		  24M with no PMH presenting with 10 months of neck/left shoulder pain accompanied with a neck mass, and 3 week history of hoarseness and dry cough admitted for malignancy workup.      Problem/Plan - 1:  ·  Problem: Mediastinal mass.  Plan: 10 month history of neck and left shoulder pain, now with new onset dry cough a hoarseness. Neck reveals lymphedema of the anterior lower neck and palpable 2 x 3 cm lymphadenopathy in the left supraclavicular area. Lungs clear to auscultation. Infiltrative cervical spine disease seen on MRI (1/13/2020) and CXR. Reports low grade fevers, chills, and night sweats intermittently within the last few weeks. Denied loss of appetite or weight loss. Has family history of malignancy. Differentials include primary lung cancer, thymoma, Hodgkin's disease  - CT A/P: Destructive and presumed metastatic lesion of C7 with mild height loss. Marrow involvement of both C7 and T1 with ventral cortical erosion of C7-T2 from prevertebral tumor. Epidural and neural foraminal extension of tumor with partial encasement of the left vertebral artery. Multiple liver lesions. Enlarged portacaval, periportal and upper retroperitoneal nodes. Malignant tumor noted in the anterior mediastinum. Trace pericardial effusion. Metastatic left supraclavicular adenopathy. Bony metastases.  - pending ultrasound directed biopsy of the left supraclavicular lymph node by interventional radiology.   - f/u CEA antigen, alpha-fetoprotein, beta subunit hCG, .  - followed by Dr Ba.   Problem/Plan - 2:  ·  Problem: Prophylactic measure.  Plan: F: s/p 1L NS  E: Replete K<4, Mg<2  N: Regular Diet.  DVT: enoxaparin prophylaxis

## 2020-01-15 NOTE — CONSULT NOTE ADULT - SUBJECTIVE AND OBJECTIVE BOX
Patient is a 24y old  Male who presents with a chief complaint of lung mass (15 Anselmo 2020 12:17)        HPI:  24M with no PMH presenting with 10 months of neck/left shoulder pain accompanied with neck mass, and 3 week history of hoarseness and dry cough. He says that the pain is dull and worsens on change of position. It starts at the neck and spreads down to his left shoulder. He denies any numbness, tingling, or loss of strength and sensation in his arm and hand. He also notes chest tightness especially over his left upper chest. He rates the pain as 6/10 in severity and says it comes and goes and sometimes keeps him up at night. He has seen multiple doctors for his symptoms including orthopedics and his primary care. His symptoms were controlled by muscle relaxers as well as aleve, Advil and Tylenol which he notes that he has been taking one of each daily in the last 3 weeks. He reports that it helps alleviate his pain for a short time. His hoarseness and cough which started a few weeks ago was attributed to a URI but his symptoms didn't resolve. He says his cough has become less frequent and his hoarseness still persists. He denies any dysphagia or odynophagia.  The patient went to see an orthopedic surgeon last week who ordered an MRI of the cervical spine which was consistent with malignant infiltration and homogeneous Infiltration of the bones of the cervical spine with no evidence of cord compression. He has family history of malignancies in his paternal grandmother and aunt with origins he cannot recall. In the past few weeks, he reports low grade fevers, intermittent chills and night sweats with no change in appetite or unintentional weight loss.     ED Course:  Vitals: T 97.9F  /92 RR 20 O2 98% RA  Labs: Wbc 5.27 Hb 14.0 Plt 200 AST 54 ALT 56 ESR 35 CRP 0.58 Flu neg, RSV neg  CXR Sclerosis of the C7 vertebral body with irregularity of the superior endplate, left upper lobe pulmonary nodule and fullness in the left hilar region  CT A/P: Destructive and presumed metastatic lesion of C7 with mild height loss. Marrow involvement of both C7 and T1 with ventral cortical erosion of C7-T2 from prevertebral tumor. Epidural and neural foraminal extension of tumor with partial encasement of the left vertebral artery. Multiple liver lesions. Enlarged portacaval, periportal and upper retroperitoneal nodes. Malignant tumor noted in the anterior mediastinum. Trace pericardial effusion. Metastatic left supraclavicular adenopathy. Bony metastases.  Given 1L NS in the ED (15 Anselmo 2020 12:05)      Allergies  dairy products (Anaphylaxis)  No Known Drug Allergies    reports mild neck pain - weakness in his left arm  Health Issues  LUNG MASS  Handoff  MEWS Score  No pertinent past medical history  Mediastinal mass  Lung mass  Transition of care performed with sharing of clinical summary  Prophylactic measure  Mediastinal mass  No significant past surgical history  MED EVAL        FAMILY HISTORY:      MEDICATIONS  (STANDING):  enoxaparin Injectable 40 milliGRAM(s) SubCutaneous every 24 hours  sodium chloride 0.9%. 1000 milliLiter(s) (125 mL/Hr) IV Continuous <Continuous>    MEDICATIONS  (PRN):      PAST MEDICAL & SURGICAL HISTORY:  No pertinent past medical history  No significant past surgical history      Labs                          14.0   5.27  )-----------( 200      ( 15 Anselmo 2020 10:35 )             43.9     01-15    142  |  103  |  13  ----------------------------<  96  4.2   |  27  |  0.75    Ca    9.8      15 Anselmo 2020 10:35    TPro  8.3  /  Alb  4.5  /  TBili  0.3  /  DBili  x   /  AST  54<H>  /  ALT  56<H>  /  AlkPhos  118  01-15      Radiology:    Physical Exam    MENTAL STATUS  -Level of Consciousness- awake    Orientation- person, place time  Language- aphasia/ dysarthria- nl  Memory- recent and remote- nl      Cranial Nerve 1- 12  Pupils- equal and reactive  Eye movements- full  Facial - no asymmetry   Lower CN-nl    Gait and Station- nl    MOTOR  Upper- LUE proximal weakness  Lower- no foot drop    Reflexes- decreased    Sensation- no sensory level    Cerebellar- no tremors    vascular - no bruits    Assessment- Multiple mets - r/o primary    Plan full metastatic w/o - also do MRI head with contrast

## 2020-01-16 DIAGNOSIS — R07.2 PRECORDIAL PAIN: ICD-10-CM

## 2020-01-16 DIAGNOSIS — I31.3 PERICARDIAL EFFUSION (NONINFLAMMATORY): ICD-10-CM

## 2020-01-16 LAB
AFP-TM SERPL-MCNC: <1.8 NG/ML — SIGNIFICANT CHANGE UP
ALBUMIN SERPL ELPH-MCNC: 3.8 G/DL — SIGNIFICANT CHANGE UP (ref 3.3–5)
ALP SERPL-CCNC: 108 U/L — SIGNIFICANT CHANGE UP (ref 40–120)
ALT FLD-CCNC: 43 U/L — SIGNIFICANT CHANGE UP (ref 10–45)
ANION GAP SERPL CALC-SCNC: 12 MMOL/L — SIGNIFICANT CHANGE UP (ref 5–17)
AST SERPL-CCNC: 38 U/L — SIGNIFICANT CHANGE UP (ref 10–40)
BILIRUB SERPL-MCNC: 0.3 MG/DL — SIGNIFICANT CHANGE UP (ref 0.2–1.2)
BUN SERPL-MCNC: 11 MG/DL — SIGNIFICANT CHANGE UP (ref 7–23)
CALCIUM SERPL-MCNC: 9.4 MG/DL — SIGNIFICANT CHANGE UP (ref 8.4–10.5)
CANCER AG125 SERPL-ACNC: 11 U/ML — SIGNIFICANT CHANGE UP
CEA SERPL-MCNC: 1.1 NG/ML — SIGNIFICANT CHANGE UP (ref 0–3.8)
CHLORIDE SERPL-SCNC: 104 MMOL/L — SIGNIFICANT CHANGE UP (ref 96–108)
CO2 SERPL-SCNC: 25 MMOL/L — SIGNIFICANT CHANGE UP (ref 22–31)
CREAT SERPL-MCNC: 0.81 MG/DL — SIGNIFICANT CHANGE UP (ref 0.5–1.3)
GLUCOSE BLDC GLUCOMTR-MCNC: 88 MG/DL — SIGNIFICANT CHANGE UP (ref 70–99)
GLUCOSE SERPL-MCNC: 91 MG/DL — SIGNIFICANT CHANGE UP (ref 70–99)
HCG-TM SERPL-ACNC: <1 MIU/ML — SIGNIFICANT CHANGE UP
HCT VFR BLD CALC: 40.9 % — SIGNIFICANT CHANGE UP (ref 39–50)
HGB BLD-MCNC: 12.8 G/DL — LOW (ref 13–17)
MAGNESIUM SERPL-MCNC: 1.8 MG/DL — SIGNIFICANT CHANGE UP (ref 1.6–2.6)
MCHC RBC-ENTMCNC: 27.5 PG — SIGNIFICANT CHANGE UP (ref 27–34)
MCHC RBC-ENTMCNC: 31.3 GM/DL — LOW (ref 32–36)
MCV RBC AUTO: 87.8 FL — SIGNIFICANT CHANGE UP (ref 80–100)
NRBC # BLD: 0 /100 WBCS — SIGNIFICANT CHANGE UP (ref 0–0)
PHOSPHATE SERPL-MCNC: 3.9 MG/DL — SIGNIFICANT CHANGE UP (ref 2.5–4.5)
PLATELET # BLD AUTO: 193 K/UL — SIGNIFICANT CHANGE UP (ref 150–400)
POTASSIUM SERPL-MCNC: 3.8 MMOL/L — SIGNIFICANT CHANGE UP (ref 3.5–5.3)
POTASSIUM SERPL-SCNC: 3.8 MMOL/L — SIGNIFICANT CHANGE UP (ref 3.5–5.3)
PROT SERPL-MCNC: 7 G/DL — SIGNIFICANT CHANGE UP (ref 6–8.3)
RBC # BLD: 4.66 M/UL — SIGNIFICANT CHANGE UP (ref 4.2–5.8)
RBC # FLD: 12.8 % — SIGNIFICANT CHANGE UP (ref 10.3–14.5)
SODIUM SERPL-SCNC: 141 MMOL/L — SIGNIFICANT CHANGE UP (ref 135–145)
WBC # BLD: 5.81 K/UL — SIGNIFICANT CHANGE UP (ref 3.8–10.5)
WBC # FLD AUTO: 5.81 K/UL — SIGNIFICANT CHANGE UP (ref 3.8–10.5)

## 2020-01-16 PROCEDURE — 70491 CT SOFT TISSUE NECK W/DYE: CPT | Mod: 26

## 2020-01-16 PROCEDURE — 93306 TTE W/DOPPLER COMPLETE: CPT | Mod: 26

## 2020-01-16 PROCEDURE — 99232 SBSQ HOSP IP/OBS MODERATE 35: CPT

## 2020-01-16 PROCEDURE — 99222 1ST HOSP IP/OBS MODERATE 55: CPT

## 2020-01-16 PROCEDURE — 78815 PET IMAGE W/CT SKULL-THIGH: CPT | Mod: 26

## 2020-01-16 RX ORDER — SODIUM CHLORIDE 9 MG/ML
1000 INJECTION INTRAMUSCULAR; INTRAVENOUS; SUBCUTANEOUS
Refills: 0 | Status: DISCONTINUED | OUTPATIENT
Start: 2020-01-16 | End: 2020-01-16

## 2020-01-16 RX ORDER — MAGNESIUM SULFATE 500 MG/ML
2 VIAL (ML) INJECTION ONCE
Refills: 0 | Status: COMPLETED | OUTPATIENT
Start: 2020-01-16 | End: 2020-01-16

## 2020-01-16 RX ORDER — LORATADINE 10 MG/1
10 TABLET ORAL EVERY 24 HOURS
Refills: 0 | Status: DISCONTINUED | OUTPATIENT
Start: 2020-01-16 | End: 2020-01-17

## 2020-01-16 RX ORDER — LANOLIN ALCOHOL/MO/W.PET/CERES
1 CREAM (GRAM) TOPICAL AT BEDTIME
Refills: 0 | Status: DISCONTINUED | OUTPATIENT
Start: 2020-01-16 | End: 2020-01-16

## 2020-01-16 RX ORDER — POTASSIUM CHLORIDE 20 MEQ
20 PACKET (EA) ORAL ONCE
Refills: 0 | Status: COMPLETED | OUTPATIENT
Start: 2020-01-16 | End: 2020-01-16

## 2020-01-16 RX ADMIN — Medication 20 MILLIEQUIVALENT(S): at 17:49

## 2020-01-16 RX ADMIN — ENOXAPARIN SODIUM 40 MILLIGRAM(S): 100 INJECTION SUBCUTANEOUS at 17:50

## 2020-01-16 RX ADMIN — SODIUM CHLORIDE 100 MILLILITER(S): 9 INJECTION INTRAMUSCULAR; INTRAVENOUS; SUBCUTANEOUS at 07:10

## 2020-01-16 RX ADMIN — Medication 250 MILLIGRAM(S): at 11:46

## 2020-01-16 RX ADMIN — Medication 250 MILLIGRAM(S): at 10:46

## 2020-01-16 RX ADMIN — Medication 50 GRAM(S): at 10:47

## 2020-01-16 NOTE — SWALLOW BEDSIDE ASSESSMENT ADULT - SLP PERTINENT HISTORY OF CURRENT PROBLEM
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 Adm with increased hoarseness, seen by ENT & found to have left TVF paralysis, and for w/u of malignancy

## 2020-01-16 NOTE — PROGRESS NOTE ADULT - SUBJECTIVE AND OBJECTIVE BOX
OVERNIGHT EVENTS: No acute events overnight.    SUBJECTIVE: Patient seen and examined at the bedside. He says he has no complaints or pain at this time.    Vital Signs Last 12 Hrs  T(F): 98.2 (01-16-20 @ 05:48), Max: 98.3 (01-15-20 @ 23:27)  HR: 83 (01-16-20 @ 05:48) (78 - 83)  BP: 110/67 (01-16-20 @ 05:48) (110/67 - 135/86)  BP(mean): --  RR: 17 (01-16-20 @ 05:48) (17 - 19)  SpO2: 99% (01-16-20 @ 05:48) (97% - 99%)  I&O's Summary      PHYSICAL EXAM:  General: In no acute distress, resting comfortably in bed  HEENT: NCAT, PERRL, EOMI, no conjunctival pallor or scleral icterus, MMM. Left supraclavicular adenopathy.  Neck: Supple, no JVD  Respiratory: Clear to auscultation bilaterally with no wheezes, rales, or rhonchi appreciated  Cardiovascular: RRR, normal S1 and S2, no murmurs, rubs, or gallops appreciated  Vascular: 2+ radial and DP pulses  Abdomen: Soft, NT/ND. Bowel sounds present in all four quadrants with no guarding, rebound tenderness, or palpable masses  Extremities: Warm and well perfused. No clubbing, cyanosis, or edema noted. Left shoulder tender to palpation and left paravertebral muscle remains hypertonic  Skin: No gross skin abnormalities or rashes noted  Neuro: AAOx3 with no cranial nerve deficits. Strength and sensation intact throughout.    LABS:                        12.8   5.81  )-----------( 193      ( 16 Jan 2020 06:37 )             40.9     01-16    141  |  104  |  11  ----------------------------<  91  3.8   |  25  |  0.81    Ca    9.4      16 Jan 2020 06:37  Phos  3.9     01-16  Mg     1.8     01-16    TPro  7.0  /  Alb  3.8  /  TBili  0.3  /  DBili  x   /  AST  38  /  ALT  43  /  AlkPhos  108  01-16      Urinalysis Basic - ( 15 Anselmo 2020 12:52 )    Color: Yellow / Appearance: Clear / SG: <=1.005 / pH: x  Gluc: x / Ketone: NEGATIVE  / Bili: Negative / Urobili: 0.2 E.U./dL   Blood: x / Protein: NEGATIVE mg/dL / Nitrite: NEGATIVE   Leuk Esterase: NEGATIVE / RBC: x / WBC x   Sq Epi: x / Non Sq Epi: x / Bacteria: x        RADIOLOGY & ADDITIONAL TESTS: Reviewed.    MEDICATIONS  (STANDING):  enoxaparin Injectable 40 milliGRAM(s) SubCutaneous every 24 hours  sodium chloride 0.9%. 1000 milliLiter(s) (100 mL/Hr) IV Continuous <Continuous>    MEDICATIONS  (PRN):  Advil PM 2 Capsule(s) 2 Capsule(s) Oral at bedtime PRN insomnia  loratadine 10 milliGRAM(s) Oral every 24 hours PRN dairy allergy  naproxen 250 milliGRAM(s) Oral every 12 hours PRN Moderate Pain (4 - 6)      Allergies    dairy products (Anaphylaxis)  No Known Drug Allergies    Intolerances

## 2020-01-16 NOTE — SWALLOW BEDSIDE ASSESSMENT ADULT - SWALLOW EVAL: PROGNOSIS
Unclear. Pt has c/o mild increasing difficulty swallowing solids but tolerating diet for now. He was educated on signs of progression of dysphagia, which is anticipated given current complaints & known disease, and will contact this c as outpt PRN.

## 2020-01-16 NOTE — PROGRESS NOTE ADULT - SUBJECTIVE AND OBJECTIVE BOX
Neurology Follow up note    Name  NELDA VELAZQUEZ    HPI:  24M with no PMH presenting with 10 months of neck/left shoulder pain accompanied with neck mass, and 3 week history of hoarseness and dry cough. He says that the pain is dull and worsens on change of position. It starts at the neck and spreads down to his left shoulder. He denies any numbness, tingling, or loss of strength and sensation in his arm and hand. He also notes chest tightness especially over his left upper chest. He rates the pain as 6/10 in severity and says it comes and goes and sometimes keeps him up at night. He has seen multiple doctors for his symptoms including orthopedics and his primary care. His symptoms were controlled by muscle relaxers as well as aleve, Advil and Tylenol which he notes that he has been taking one of each daily in the last 3 weeks. He reports that it helps alleviate his pain for a short time. His hoarseness and cough which started a few weeks ago was attributed to a URI but his symptoms didn't resolve. He says his cough has become less frequent and his hoarseness still persists. He denies any dysphagia or odynophagia.  The patient went to see an orthopedic surgeon last week who ordered an MRI of the cervical spine which was consistent with malignant infiltration and homogeneous Infiltration of the bones of the cervical spine with no evidence of cord compression. He has family history of malignancies in his paternal grandmother and aunt with origins he cannot recall. In the past few weeks, he reports low grade fevers, intermittent chills and night sweats with no change in appetite or unintentional weight loss.     ED Course:  Vitals: T 97.9F  /92 RR 20 O2 98% RA  Labs: Wbc 5.27 Hb 14.0 Plt 200 AST 54 ALT 56 ESR 35 CRP 0.58 Flu neg, RSV neg  CXR Sclerosis of the C7 vertebral body with irregularity of the superior endplate, left upper lobe pulmonary nodule and fullness in the left hilar region  CT A/P: Destructive and presumed metastatic lesion of C7 with mild height loss. Marrow involvement of both C7 and T1 with ventral cortical erosion of C7-T2 from prevertebral tumor. Epidural and neural foraminal extension of tumor with partial encasement of the left vertebral artery. Multiple liver lesions. Enlarged portacaval, periportal and upper retroperitoneal nodes. Malignant tumor noted in the anterior mediastinum. Trace pericardial effusion. Metastatic left supraclavicular adenopathy. Bony metastases.  Given 1L NS in the ED (15 Anselmo 2020 12:05)      Interval History - left VC paralysis - no headaches or diplopia        REVIEW OF SYSTEMS    Vital Signs Last 24 Hrs  T(C): 36.8 (16 Jan 2020 05:48), Max: 36.9 (15 Anselmo 2020 17:26)  T(F): 98.2 (16 Jan 2020 05:48), Max: 98.5 (15 Anselmo 2020 17:26)  HR: 83 (16 Jan 2020 05:48) (76 - 111)  BP: 110/67 (16 Jan 2020 05:48) (110/67 - 143/92)  BP(mean): --  RR: 17 (16 Jan 2020 05:48) (17 - 20)  SpO2: 99% (16 Jan 2020 05:48) (97% - 100%)    Physical Exam-     Mental Status- awake and alert     Cranial Nerves- no diplopia    Gait and station- no foot drop    Motor- LUE proximal weakness     Reflexes- intact    Sensation- no sensory level    Coordination- no tremors    Vascular - no bruits    Medications  Advil PM 2 Capsule(s) 2 Capsule(s) Oral at bedtime PRN  enoxaparin Injectable 40 milliGRAM(s) SubCutaneous every 24 hours  loratadine 10 milliGRAM(s) Oral every 24 hours PRN  naproxen 250 milliGRAM(s) Oral every 12 hours PRN  sodium chloride 0.9%. 1000 milliLiter(s) IV Continuous <Continuous>      Lab      Radiology    Assessment- r/o malignancy     Plan MRI head with Gado. speech and swallow evaluation

## 2020-01-16 NOTE — PROGRESS NOTE ADULT - SUBJECTIVE AND OBJECTIVE BOX
PUD FOR DR SOUZA    All new data reviewed, including VS, lab, imaging, Rx and documentation.    24M with no PMH presenting with 10 months of neck/left shoulder pain accompanied with neck mass, and 3 week history of hoarseness and dry cough. He says that the pain is dull and worsens on change of position. It starts at the neck and spreads down to his left shoulder. He denies any numbness, tingling, or loss of strength and sensation in his arm and hand. He also notes chest tightness especially over his left upper chest. He rates the pain as 6/10 in severity and says it comes and goes and sometimes keeps him up at night. He has seen multiple doctors for his symptoms including orthopedics and his primary care. His symptoms were controlled by muscle relaxers as well as aleve, Advil and Tylenol which he notes that he has been taking one of each daily in the last 3 weeks. He reports that it helps alleviate his pain for a short time. His hoarseness and cough which started a few weeks ago was attributed to a URI but his symptoms didn't resolve. He says his cough has become less frequent and his hoarseness still persists. He denies any dysphagia or odynophagia.  The patient went to see an orthopedic surgeon last week who ordered an MRI of the cervical spine which was consistent with malignant infiltration and homogeneous Infiltration of the bones of the cervical spine with no evidence of cord compression. He has family history of malignancies in his paternal grandmother and aunt with origins he cannot recall. In the past few weeks, he reports low grade fevers, intermittent chills and night sweats with no change in appetite or unintentional weight loss.     Yesterday needle aspiration/biopsy. Today erythema due to tape allergy. TTE normal. For PET.    ED Course:  Vitals: T 97.9F  /92 RR 20 O2 98% RA  Labs: Wbc 5.27 Hb 14.0 Plt 200 AST 54 ALT 56 ESR 35 CRP 0.58 Flu neg, RSV neg  CXR Sclerosis of the C7 vertebral body with irregularity of the superior endplate, left upper lobe pulmonary nodule and fullness in the left hilar region  CT A/P: Destructive and presumed metastatic lesion of C7 with mild height loss. Marrow involvement of both C7 and T1 with ventral cortical erosion of C7-T2 from prevertebral tumor. Epidural and neural foraminal extension of tumor with partial encasement of the left vertebral artery. Multiple liver lesions. Enlarged portacaval, periportal and upper retroperitoneal nodes. Malignant tumor noted in the anterior mediastinum. Trace pericardial effusion. Metastatic left supraclavicular adenopathy. Bony metastases.  Given 1L NS in the ED.       Review of Systems:  Other Review of Systems: All other review of systems negative, except as noted in HPI	      Allergies and Intolerances:        Allergies:  	No Known Drug Allergies:   	dairy products: Food, Anaphylaxis    Home Medications:   * Outpatient Medication Status not yet specified    .    Patient History:    Past Medical, Past Surgical, and Family History:  PAST MEDICAL HISTORY:  No pertinent past medical history.     PAST SURGICAL HISTORY:  No significant past surgical history.     Social History:  Social History (marital status, living situation, occupation, tobacco use, alcohol and drug use, and sexual history): Denies smoking, alcohol, drug use. Lives at home with his parents.	     Tobacco Screening:  · Core Measure Site	No	  · Has the patient used tobacco in the past 30 days?	No	    Risk Assessment:    Present on Admission:  Deep Venous Thrombosis	no	  Pulmonary Embolus	no	     Heart Failure:  Does this patient have a history of or has been diagnosed with heart failure? no.    HIV Screen (per [facility] of Health, HIV screening must be offered to every individual between ages 13 and 64)	Unable to offer due to clinical condition	       Physical Exam:  Physical Exam: Vital Signs Last 12 Hrs  T(F): 97.9 (01-15-20 @ 09:07), Max: 97.9 (01-15-20 @ 09:07)  HR: 111 (01-15-20 @ 09:07) (111 - 111)  BP: 143/92 (01-15-20 @ 09:07) (143/92 - 143/92)  BP(mean): --  RR: 20 (01-15-20 @ 09:07) (20 - 20)  SpO2: 97% (01-15-20 @ 09:07) (97% - 97%)   PHYSICAL EXAM:  Constitutional: NAD, comfortable in bed.  HEENT: NC/AT, PERRLA, EOMI, no conjunctival pallor or scleral icterus, MMM  Neck: 2 x 3 cm lymphadenopathy in the left supraclavicular area with no pain on palpation, no JVD  Respiratory: CTA B/L. No w/r/r. Breath sounds are normal.  Cardiovascular: RRR, normal S1 and S2, no m/r/g.   Gastrointestinal: +BS, soft NTND, no guarding or rebound tenderness, no palpable masses   Extremities: pain on palpation on the left neck and left shoulder, wwp; no cyanosis, clubbing or edema.   Vascular: Pulses equal and strong throughout.   Neurological: AAOx3, no CN deficits, strength and sensation intact throughout.   MSK:  hypertonic muscle in the left paravertebral region and intercostal on the left anterior chest Skin: increased redness on the upper chest bilaterally, No gross skin abnormalities or rashes	       Labs and Results:  Labs, Radiology, Cardiology, and Other Results: LABS:                         14.0   5.27  )-----------( 200      ( 15 Anselmo 2020 10:35 )             43.9    01-15   142  |  103  |  13  ----------------------------<  96  4.2   |  27  |  0.75   Ca    9.8      15 Anselmo 2020 10:35   TPro  8.3  /  Alb  4.5  /  TBili  0.3  /  DBili  x   /  AST  54<H>  /  ALT  56<H>  /  AlkPhos  118  01-15  RADIOLOGY, EKG & ADDITIONAL TESTS:  INTERPRETATION:  CT of the CHEST WITH INTRAVENOUS CONTRAST DATED : 1/15/2020 9:19 AM  INDICATION: bone cancer  needs met eval  TECHNIQUE: CT of the chest was performed. Axial and coronal and sagittal  images were produced and reviewed.  CONTRAST USAGE: IV contrast: Optiray 350: 100 ml administered; ml discarded.   PRIOR STUDIES: None.  FINDINGS: The heart is normal in size.  Trace pericardial effusion is seen.  The great vessels are unremarkable.  No mediastinal  or axillary  lymphadenopathy is seen. 0.9 cm right hilar node. There is a large inhomogeneous solid anterior mediastinal mass measuring 6.6 x 8.4 x 8.1 cm. Superior extension noted with encasement of the left common carotid artery and left subclavian artery. The thyroid gland is unremarkable. Enlarged left supraclavicular inhomogeneous nodes measuring 1.7 x 2.2 x 2.9 cm. Left internal jugular vein not identified. There is occlusion and encasement of the left brachiocephalic vein and probably the proximal left subclavian vein.   Evaluation of the pulmonary parenchyma demonstrates no abnormality.  No pleural effusions are seen.  Evaluation of the osseous structures multiple mixed osteolytic osteoblastic blastic lesions. These include manubrium of sternum associated with periosteal reaction and extra osseous extension, C7 vertebral body, left posterior 11th rib.  Bilateral gynecomastia.   IMPRESSION: Malignant tumor noted in the anterior mediastinum. Differential diagnosis would include lymphoma, malignant germ cell tumor. Trace pericardial effusion. Metastatic left supraclavicular adenopathy. Bony metastases as described above. Also recommend scrotal ultrasound for evaluation of testes.	    Assessment and Plan:    Assessment:  · Assessment		  24M with no PMH presenting with 10 months of neck/left shoulder pain accompanied with a neck mass, and 3 week history of hoarseness and dry cough admitted for malignancy workup.      Problem/Plan - 1:  ·  Problem: Mediastinal mass.  Plan: 10 month history of neck and left shoulder pain, now with new onset dry cough a hoarseness. Neck reveals lymphedema of the anterior lower neck and palpable 2 x 3 cm lymphadenopathy in the left supraclavicular area. Lungs clear to auscultation. Infiltrative cervical spine disease seen on MRI (1/13/2020) and CXR. Reports low grade fevers, chills, and night sweats intermittently within the last few weeks. Denied loss of appetite or weight loss. Has family history of malignancy. Differentials include primary lung cancer, thymoma, Hodgkin's disease  - CT A/P: Destructive and presumed metastatic lesion of C7 with mild height loss. Marrow involvement of both C7 and T1 with ventral cortical erosion of C7-T2 from prevertebral tumor. Epidural and neural foraminal extension of tumor with partial encasement of the left vertebral artery. Multiple liver lesions. Enlarged portacaval, periportal and upper retroperitoneal nodes. Malignant tumor noted in the anterior mediastinum. Trace pericardial effusion. Metastatic left supraclavicular adenopathy. Bony metastases.  - pending ultrasound directed biopsy of the left supraclavicular lymph node by interventional radiology.   - f/u CEA antigen, alpha-fetoprotein, beta subunit hCG, .  - followed by Dr Ba.   Problem/Plan - 2:  ·  Problem: Prophylactic measure.  Plan: F: s/p 1L NS  E: Replete K<4, Mg<2  N: Regular Diet.  DVT: enoxaparin prophylaxis

## 2020-01-16 NOTE — SWALLOW BEDSIDE ASSESSMENT ADULT - COMMENTS
Received awake & alert, sitting up in bed. Family present. Language skills are intact at the conversational level. Voice is moderately hoarse, c/w L TVF paralysis.

## 2020-01-16 NOTE — CONSULT NOTE ADULT - SUBJECTIVE AND OBJECTIVE BOX
Patient is a 24y old  Male who presents with a chief complaint of Malignancy Workup (16 Jan 2020 08:50)      HPI:  24M with no PMH presenting with 10 months of neck/left shoulder pain accompanied with neck mass, and 3 week history of hoarseness and dry cough. He says that the pain is dull and worsens on change of position. It starts at the neck and spreads down to his left shoulder. He denies any numbness, tingling, or loss of strength and sensation in his arm and hand. He also notes chest tightness especially over his left upper chest. He rates the pain as 6/10 in severity and says it comes and goes and sometimes keeps him up at night. He has seen multiple doctors for his symptoms including orthopedics and his primary care. His symptoms were controlled by muscle relaxers as well as aleve, Advil and Tylenol which he notes that he has been taking one of each daily in the last 3 weeks. He reports that it helps alleviate his pain for a short time. His hoarseness and cough which started a few weeks ago was attributed to a URI but his symptoms didn't resolve. He says his cough has become less frequent and his hoarseness still persists. He denies any dysphagia or odynophagia.  The patient went to see an orthopedic surgeon last week who ordered an MRI of the cervical spine which was consistent with malignant infiltration and homogeneous Infiltration of the bones of the cervical spine with no evidence of cord compression. He has family history of malignancies in his paternal grandmother and aunt with origins he cannot recall. In the past few weeks, he reports low grade fevers, intermittent chills and night sweats with no change in appetite or unintentional weight loss.     ED Course:  Vitals: T 97.9F  /92 RR 20 O2 98% RA  Labs: Wbc 5.27 Hb 14.0 Plt 200 AST 54 ALT 56 ESR 35 CRP 0.58 Flu neg, RSV neg  CXR Sclerosis of the C7 vertebral body with irregularity of the superior endplate, left upper lobe pulmonary nodule and fullness in the left hilar region  CT A/P: Destructive and presumed metastatic lesion of C7 with mild height loss. Marrow involvement of both C7 and T1 with ventral cortical erosion of C7-T2 from prevertebral tumor. Epidural and neural foraminal extension of tumor with partial encasement of the left vertebral artery. Multiple liver lesions. Enlarged portacaval, periportal and upper retroperitoneal nodes. Malignant tumor noted in the anterior mediastinum. Trace pericardial effusion. Metastatic left supraclavicular adenopathy. Bony metastases.  Given 1L NS in the ED (15 Anselmo 2020 12:05)      PAST MEDICAL & SURGICAL HISTORY:  No pertinent past medical history  No significant past surgical history      PREVIOUS DIAGNOSTIC TESTING:      ECHO  FINDINGS:    STRESS  FINDINGS:    CATHETERIZATION  FINDINGS:    MEDICATIONS  (STANDING):  enoxaparin Injectable 40 milliGRAM(s) SubCutaneous every 24 hours  sodium chloride 0.9%. 1000 milliLiter(s) (100 mL/Hr) IV Continuous <Continuous>    MEDICATIONS  (PRN):  Advil PM 2 Capsule(s) 2 Capsule(s) Oral at bedtime PRN insomnia  loratadine 10 milliGRAM(s) Oral every 24 hours PRN dairy allergy  naproxen 250 milliGRAM(s) Oral every 12 hours PRN Moderate Pain (4 - 6)      FAMILY HISTORY:      SOCIAL HISTORY:    CIGARETTES:    ALCOHOL:    REVIEW OF SYSTEMS:  CONSTITUTIONAL: No fever, weight loss, or fatigue  EYES: No eye pain, visual disturbances, or discharge  ENMT:  No difficulty hearing, tinnitus, vertigo; No sinus or throat pain, + hoarseness  NECK: + pain    RESPIRATORY: No cough, wheezing, chills or hemoptysis; No Shortness of Breath  CARDIOVASCULAR: + chest pain, no palpitations, passing out, dizziness, or leg swelling  GASTROINTESTINAL: No abdominal or epigastric pain. No nausea, vomiting, or hematemesis; No diarrhea or constipation. No melena or hematochezia.  GENITOURINARY: No dysuria, frequency, hematuria, or incontinence  NEUROLOGICAL: No headaches, memory loss, loss of strength, numbness, or tremors  SKIN: No itching, burning, rashes, or lesions   LYMPH Nodes: No enlarged glands  ENDOCRINE: No heat or cold intolerance; No hair loss  MUSCULOSKELETAL: + shoulder and neck pain  PSYCHIATRIC: No depression, anxiety, mood swings, or difficulty sleeping  HEME/LYMPH: No easy bruising, or bleeding gums  ALLERY AND IMMUNOLOGIC: No hives or eczema  	  Vital Signs Last 24 Hrs  T(C): 36.8 (16 Jan 2020 05:48), Max: 36.9 (15 Anselmo 2020 17:26)  T(F): 98.2 (16 Jan 2020 05:48), Max: 98.5 (15 Anselmo 2020 17:26)  HR: 83 (16 Jan 2020 05:48) (76 - 111)  BP: 110/67 (16 Jan 2020 05:48) (110/67 - 143/92)  BP(mean): --  RR: 17 (16 Jan 2020 05:48) (17 - 20)  SpO2: 99% (16 Jan 2020 05:48) (97% - 100%)    PHYSICAL EXAM:  Appearance: Normal	  HEENT:   Normal oral mucosa, PERRL, EOMI	  Lymphatic: No lymphadenopathy  Cardiovascular: Normal S1 S2, No JVD, No murmurs, No edema  Respiratory: Lungs clear to auscultation	  Psychiatry: A & O x 3, Mood & affect appropriate  Gastrointestinal:  Soft, Non-tender, + BS	  Skin: No rashes, No ecchymoses, No cyanosis  Neurologic: Non-focal  Extremities: Normal range of motion, No clubbing, cyanosis or edema  Vascular: Peripheral pulses palpable 2+ bilaterally      INTERPRETATION OF TELEMETRY:    ECG:    I&O's Detail      LABS:                        12.8   5.81  )-----------( 193      ( 16 Jan 2020 06:37 )             40.9     01-16    141  |  104  |  11  ----------------------------<  91  3.8   |  25  |  0.81    Ca    9.4      16 Jan 2020 06:37  Phos  3.9     01-16  Mg     1.8     01-16    TPro  7.0  /  Alb  3.8  /  TBili  0.3  /  DBili  x   /  AST  38  /  ALT  43  /  AlkPhos  108  01-16          Urinalysis Basic - ( 15 Anselmo 2020 12:52 )    Color: Yellow / Appearance: Clear / SG: <=1.005 / pH: x  Gluc: x / Ketone: NEGATIVE  / Bili: Negative / Urobili: 0.2 E.U./dL   Blood: x / Protein: NEGATIVE mg/dL / Nitrite: NEGATIVE   Leuk Esterase: NEGATIVE / RBC: x / WBC x   Sq Epi: x / Non Sq Epi: x / Bacteria: x      I&O's Summary    BNP  RADIOLOGY & ADDITIONAL STUDIES:

## 2020-01-16 NOTE — PROGRESS NOTE ADULT - SUBJECTIVE AND OBJECTIVE BOX
SUBJECTIVE: Patient seen and examined.    OBJECTIVE:  NAD  Vital Signs Last 24 Hrs  T(C): 36.8 (16 Jan 2020 05:48), Max: 36.9 (15 Anselmo 2020 17:26)  T(F): 98.2 (16 Jan 2020 05:48), Max: 98.5 (15 Anselmo 2020 17:26)  HR: 83 (16 Jan 2020 05:48) (76 - 88)  BP: 110/67 (16 Jan 2020 05:48) (110/67 - 135/86)  BP(mean): --  RR: 17 (16 Jan 2020 05:48) (17 - 19)  SpO2: 99% (16 Jan 2020 05:48) (97% - 100%)    PE: Comfortable, laying in bed  Fullness to cervical spine left > greater than right with lymphadenopathy.   Decreased ROM to cervical spine secondary to pain.   Motor:  R: intact Delt, B, T, FDS, EPL, IO  L: intact  Delt, B, T, FDS, EPL, IO  Sensation:  R: SILT C5-T1  L:  SILT C5-T1  Cap refill brisk. Skin warm and well perfused.   Radial pulses palpable.                     12.8   5.81  )-----------( 193      ( 16 Jan 2020 06:37 )             40.9     01-16    141  |  104  |  11  ----------------------------<  91  3.8   |  25  |  0.81    Ca    9.4      16 Jan 2020 06:37  Phos  3.9     01-16  Mg     1.8     01-16    TPro  7.0  /  Alb  3.8  /  TBili  0.3  /  DBili  x   /  AST  38  /  ALT  43  /  AlkPhos  108  01-16    Mediastinal mass.   Outpatient MRI with infiltrative cervical spine disease. Has had fevers, night sweats, chills, weight loss.   CT Cervical spine:   "Destructive and presumed metastatic lesion of C7 with mild height loss. Marrow involvement of both C7 and T1 with ventral cortical erosion of C7-T2 from prevertebral tumor. Epidural and neural foraminal extension of tumor with partial encasement of the left vertebral artery.   CT Abd/Pelvis:   Multiple liver lesions. Enlarged portacaval, periportal and upper retroperitoneal nodes. Malignant tumor noted in the anterior mediastinum. Trace pericardial effusion. Metastatic left supraclavicular adenopathy. Bony metastases."     A/P :  Pt is a 25yo Male with mediastinal mass & presumed metastatic lesions of C7-T2 s/p US biopsy of left lymph node.   -    discussion was had between Dr. Langley and Patient regarding performing excisional biopsy of left superclavicular  lymph nodes with Dr Damico tomorrow  -    consent was obtained and patients questions were answered appropriately  -   patient should be NPO at midnight, hold anticoagulation, and start IVF.  -   Please call ortho pager if there are any questions.    Shane Glaser MD  Senior Orthopaedic Resident  Orthopaedic Surgery SUBJECTIVE: Patient seen and examined.    OBJECTIVE:  NAD  Vital Signs Last 24 Hrs  T(C): 36.8 (16 Jan 2020 05:48), Max: 36.9 (15 Anselmo 2020 17:26)  T(F): 98.2 (16 Jan 2020 05:48), Max: 98.5 (15 Anselmo 2020 17:26)  HR: 83 (16 Jan 2020 05:48) (76 - 88)  BP: 110/67 (16 Jan 2020 05:48) (110/67 - 135/86)  BP(mean): --  RR: 17 (16 Jan 2020 05:48) (17 - 19)  SpO2: 99% (16 Jan 2020 05:48) (97% - 100%)    PE: Comfortable, laying in bed  Fullness to cervical spine left > greater than right with lymphadenopathy.   Decreased ROM to cervical spine secondary to pain.   Motor:  R: intact Delt, B, T, FDS, EPL, IO  L: intact  Delt, B, T, FDS, EPL, IO  Sensation:  R: SILT C5-T1  L:  SILT C5-T1  Cap refill brisk. Skin warm and well perfused.   Radial pulses palpable.                     12.8   5.81  )-----------( 193      ( 16 Jan 2020 06:37 )             40.9     01-16    141  |  104  |  11  ----------------------------<  91  3.8   |  25  |  0.81    Ca    9.4      16 Jan 2020 06:37  Phos  3.9     01-16  Mg     1.8     01-16    TPro  7.0  /  Alb  3.8  /  TBili  0.3  /  DBili  x   /  AST  38  /  ALT  43  /  AlkPhos  108  01-16    Mediastinal mass.   Outpatient MRI with infiltrative cervical spine disease. Has had fevers, night sweats, chills, weight loss.   CT Cervical spine:   "Destructive and presumed metastatic lesion of C7 with mild height loss. Marrow involvement of both C7 and T1 with ventral cortical erosion of C7-T2 from prevertebral tumor. Epidural and neural foraminal extension of tumor with partial encasement of the left vertebral artery.   CT Abd/Pelvis:   Multiple liver lesions. Enlarged portacaval, periportal and upper retroperitoneal nodes. Malignant tumor noted in the anterior mediastinum. Trace pericardial effusion. Metastatic left supraclavicular adenopathy. Bony metastases."     A/P :  Pt is a 25yo Male with mediastinal mass & presumed metastatic lesions of C7-T2 s/p US biopsy of left lymph node.   -    discussion was had between Dr. Langley and Patient regarding performing excisional biopsy of left supraclavicular  lymph nodes with Dr Damico tomorrow  -    consent was obtained and patients questions were answered appropriately  -   patient should be NPO at midnight, hold anticoagulation, and start IVF.  -   Please call ortho pager if there are any questions.    Shane Glaser MD  Senior Orthopaedic Resident  Orthopaedic Surgery

## 2020-01-17 VITALS
SYSTOLIC BLOOD PRESSURE: 132 MMHG | OXYGEN SATURATION: 96 % | DIASTOLIC BLOOD PRESSURE: 85 MMHG | RESPIRATION RATE: 18 BRPM | TEMPERATURE: 99 F | HEART RATE: 78 BPM

## 2020-01-17 LAB — NON-GYNECOLOGICAL CYTOLOGY STUDY: SIGNIFICANT CHANGE UP

## 2020-01-17 PROCEDURE — 85652 RBC SED RATE AUTOMATED: CPT

## 2020-01-17 PROCEDURE — 84100 ASSAY OF PHOSPHORUS: CPT

## 2020-01-17 PROCEDURE — 99232 SBSQ HOSP IP/OBS MODERATE 35: CPT

## 2020-01-17 PROCEDURE — 86140 C-REACTIVE PROTEIN: CPT

## 2020-01-17 PROCEDURE — 87631 RESP VIRUS 3-5 TARGETS: CPT

## 2020-01-17 PROCEDURE — 86304 IMMUNOASSAY TUMOR CA 125: CPT

## 2020-01-17 PROCEDURE — 93005 ELECTROCARDIOGRAM TRACING: CPT

## 2020-01-17 PROCEDURE — 85027 COMPLETE CBC AUTOMATED: CPT

## 2020-01-17 PROCEDURE — 38505 NEEDLE BIOPSY LYMPH NODES: CPT

## 2020-01-17 PROCEDURE — 88341 IMHCHEM/IMCYTCHM EA ADD ANTB: CPT

## 2020-01-17 PROCEDURE — 84155 ASSAY OF PROTEIN SERUM: CPT

## 2020-01-17 PROCEDURE — 72040 X-RAY EXAM NECK SPINE 2-3 VW: CPT

## 2020-01-17 PROCEDURE — 85025 COMPLETE CBC W/AUTO DIFF WBC: CPT

## 2020-01-17 PROCEDURE — 88342 IMHCHEM/IMCYTCHM 1ST ANTB: CPT

## 2020-01-17 PROCEDURE — 74177 CT ABD & PELVIS W/CONTRAST: CPT

## 2020-01-17 PROCEDURE — 88307 TISSUE EXAM BY PATHOLOGIST: CPT

## 2020-01-17 PROCEDURE — 81003 URINALYSIS AUTO W/O SCOPE: CPT

## 2020-01-17 PROCEDURE — 83735 ASSAY OF MAGNESIUM: CPT

## 2020-01-17 PROCEDURE — 93306 TTE W/DOPPLER COMPLETE: CPT

## 2020-01-17 PROCEDURE — 78815 PET IMAGE W/CT SKULL-THIGH: CPT

## 2020-01-17 PROCEDURE — 99231 SBSQ HOSP IP/OBS SF/LOW 25: CPT

## 2020-01-17 PROCEDURE — 71046 X-RAY EXAM CHEST 2 VIEWS: CPT

## 2020-01-17 PROCEDURE — 72126 CT NECK SPINE W/DYE: CPT

## 2020-01-17 PROCEDURE — A9552: CPT

## 2020-01-17 PROCEDURE — 84165 PROTEIN E-PHORESIS SERUM: CPT

## 2020-01-17 PROCEDURE — 84704 HCG FREE BETACHAIN TEST: CPT

## 2020-01-17 PROCEDURE — 88305 TISSUE EXAM BY PATHOLOGIST: CPT

## 2020-01-17 PROCEDURE — 99285 EMERGENCY DEPT VISIT HI MDM: CPT

## 2020-01-17 PROCEDURE — 92610 EVALUATE SWALLOWING FUNCTION: CPT

## 2020-01-17 PROCEDURE — 82378 CARCINOEMBRYONIC ANTIGEN: CPT

## 2020-01-17 PROCEDURE — 82962 GLUCOSE BLOOD TEST: CPT

## 2020-01-17 PROCEDURE — 36415 COLL VENOUS BLD VENIPUNCTURE: CPT

## 2020-01-17 PROCEDURE — 82105 ALPHA-FETOPROTEIN SERUM: CPT

## 2020-01-17 PROCEDURE — 88173 CYTOPATH EVAL FNA REPORT: CPT

## 2020-01-17 PROCEDURE — 80053 COMPREHEN METABOLIC PANEL: CPT

## 2020-01-17 PROCEDURE — 71260 CT THORAX DX C+: CPT

## 2020-01-17 PROCEDURE — 70491 CT SOFT TISSUE NECK W/DYE: CPT

## 2020-01-17 PROCEDURE — 76942 ECHO GUIDE FOR BIOPSY: CPT

## 2020-01-17 RX ORDER — DENOSUMAB 60 MG/ML
120 INJECTION SUBCUTANEOUS ONCE
Refills: 0 | Status: COMPLETED | OUTPATIENT
Start: 2020-01-17 | End: 2020-01-17

## 2020-01-17 RX ADMIN — DENOSUMAB 120 MILLIGRAM(S): 60 INJECTION SUBCUTANEOUS at 14:39

## 2020-01-17 RX ADMIN — Medication 250 MILLIGRAM(S): at 09:29

## 2020-01-17 RX ADMIN — Medication 250 MILLIGRAM(S): at 14:43

## 2020-01-17 NOTE — DISCHARGE NOTE PROVIDER - HOSPITAL COURSE
24-year-old male with no significant past medical history presenting with 10 months of neck and left shoulder pain associated with a neck mass and 3 weeks of hoarseness and dry cough. Admitted for malignancy workup.         Problem List/Main Diagnoses (system-based):     1. Malignancy Workup - Patient had a CT Head, CT Spine, CT Chest, MRI brain, and PET scan done. CEA, AFP, CA-125 ordered.        Inpatient treatment course: Lymph node biopsy, imaging as above, Denosumab 120mg 1 dose. To be discharged and follow up with [facility].    New medications: None    Labs to be followed outpatient: Per [facility]    Exam to be followed outpatient: Per [facility]

## 2020-01-17 NOTE — PROGRESS NOTE ADULT - ASSESSMENT
I met with the patient And his girlfriend earlier this morning and then met with His parents at 8:30 AM and they wanted to take the patient immediately to [facility]. Their personal friends with the president and medical Director of the institution and they have set up the care for the patient there.  They also plan to go to [place] for major [place] and plan to take their son with them.  The patient is at high risk of skeletal events.  I mentioned to the family earlier that I would undertake no therapy which would compromise any clinical trial or any therapy by any other institution.  The patient's mother and father within taken to the pathology department and introduced to the pathologist caring for the biopsy specimen and the pathologist who would be signing out the final pathology by 2 0 Wednesday and all and entities were exchanged.  I took the patients than to the radiology department to see the PET CT scan with Dr. Star Jain and the patient's condition was reviewed including the vascular abnormalities from compression of the cancer.  The patient's family was appraised of the risk of skeletal events as well as the risk of compression of the blood vessels draining the left arm returning to the heart.  Copies were made of all the radiology tests performed in the institution.  The MRI which had been mailed back to my office was to be picked up later by the family when it arrived at my office.

## 2020-01-17 NOTE — DISCHARGE NOTE PROVIDER - NSDCCPTREATMENT_GEN_ALL_CORE_FT
PRINCIPAL PROCEDURE  Procedure: NM scan whole body  Findings and Treatment: IMPRESSION: Anterior mediastinal mass with metastases to the lymph nodes, liver, and bones. The differential diagnosis includes lymphoma, thymic cancer, teratoma. Correlation with tissue biopsy is recommended.        SECONDARY PROCEDURE  Procedure: CT chest, with contrast  Findings and Treatment: IMPRESSION: Malignant tumor noted in the anterior mediastinum. Differential diagnosis would include lymphoma, malignant germ cell tumor. Trace pericardial effusion. Metastatic left supraclavicular adenopathy. Bony metastases as described above. Also recommend scrotal ultrasound for evaluation of testes.    Procedure: CT cervical spine  Findings and Treatment: Destructive and presumed metastatic lesion of C7 with mild height loss. Marrow involvement of both C7 and T1 with ventral cortical erosion of C7-T2 from prevertebral tumor seen on outside MR. There is also epidural and neural foraminal extension of tumor with partial encasement of the left vertebral artery. Extraosseous tumor would be better defined with contrast-enhanced MR and/or PET-CT.  No malalignment.  Extraspinal malignancy as seen on same day chest CT, with additional note of left cervical lymphadenopathy and evidence of left vocal palsy.    Procedure: CT abdomen and pelvis  Findings and Treatment: The liver is normal in size. Multiple liver inhomogeneous liver lesions including 2.6 x 2.2 cm segment two, coalescent lesion measuring 4.8 x 3.6 cm segment six. There are other smaller lesions in the right lobe measuring 1.9 x 2.1 cm, 2.0 x 1.1 cm, 1.2 x 1.0 cm..  No radiopaque stones are seen in the gallbladder.  The pancreas is normal in appearance.  No splenic abnormalities are seen. Enlarged inhomogeneous periportal and portacaval nodes up to 1.9 cm diameter.  The adrenal glands are unremarkable. The kidneys are normal in appearance.      No abdominal aortic aneurysm is seen. Slightly enlarged high retroperitoneal nodes including  paraaortic, interaortocaval , preaortic. Small mesenteric nodes.  Evaluation of the bowel demonstrates that the ingested oral contrast material has reached the hepatic flexure of colon. No bowel obstruction. Normal appendix.. No ascites is seen.  Images of the pelvis demonstrate the prostate and seminal vesicles to be normal in appearance. The prostate measures 3.5 x 2.5 x 2.3 cm. The bladder is distended but otherwise normal.  Evaluation of the osseous structures demonstrates multiple mixed osteolytic and osteoblastic bone lesions including right ischial tuberosity, right sacral a lower, right posterior ilium, left posterior iliac bone left posterior 11th rib associated with an extrapleural mass.  IMPRESSION: Multiple liver lesions are likely metastases versus lymphoma.  Enlarged portacaval, periportal and upper retroperitoneal nodes.    Procedure: CT soft tissue neck w con  Findings and Treatment: IMPRESSION:  Left vocal cord paralysis is again evident, with tumor noted along the course of the left recurrent laryngeal nerve in the mediastinum. There is left infrahyoid cervical lymphadenopathy. Increasing retropharyngeal and left chest wall edema with prominent cutaneous and subcutaneous vasculature likely reflects venous obstruction in the chest.

## 2020-01-17 NOTE — PROGRESS NOTE ADULT - ATTENDING COMMENTS
Xgeva 120 mg subcutaneously  Citracal D slow release 2 tablets twice a day  Discharge the patient to his family and [facility] [place]

## 2020-01-17 NOTE — PROGRESS NOTE ADULT - PROBLEM SELECTOR PLAN 1
seen on CT but NOT confirmed by echo.  Will follow as needed.
10 months of neck and left shoulder pain now with new cough and hoarseness. Outpatient MRI with infiltrative cervical spine disease. Has had fevers, night sweats, chills, weight loss. Family history of malignancy but unclear as to what kind. CT A/P: "Destructive and presumed metastatic lesion of C7 with mild height loss. Marrow involvement of both C7 and T1 with ventral cortical erosion of C7-T2 from prevertebral tumor. Epidural and neural foraminal extension of tumor with partial encasement of the left vertebral artery. Multiple liver lesions. Enlarged portacaval, periportal and upper retroperitoneal nodes. Malignant tumor noted in the anterior mediastinum. Trace pericardial effusion. Metastatic left supraclavicular adenopathy. Bony metastases." Status post US biopsy of left node.    -Follow up pathology report  -CEA 1.1  -CA-125 11  -Hcg <1  -Follow up MRI  -Follow up Dr. Ba recommendations

## 2020-01-17 NOTE — PROGRESS NOTE ADULT - NSHPATTENDINGPLANDISCUSS_GEN_ALL_CORE
The patient, his girlfriend, his mother and father, Jay Jain and the tube pathologists involved in the care of the patient

## 2020-01-17 NOTE — DISCHARGE NOTE PROVIDER - NSDCCPCAREPLAN_GEN_ALL_CORE_FT
PRINCIPAL DISCHARGE DIAGNOSIS  Diagnosis: Mediastinal mass  Assessment and Plan of Treatment: You came to the emergency department with a history of a neck mass and shoulder pain associated with hoarseness and a dry cough. You have also been having night sweats. You had several initial scans done while inpatient had a lymph node biopsy performed by interventional radiology. Your workup thus far is suspicious for malignancy and you have requested disccharge to follow up with [facility] for the remainder of your care. You were seen by Dr. Ba from Oncology and received Denosumab 120mg. PRINCIPAL DISCHARGE DIAGNOSIS  Diagnosis: Mediastinal mass  Assessment and Plan of Treatment: You came to the emergency department with a history of a neck mass and shoulder pain associated with hoarseness and a dry cough. You have also been having night sweats. You had several initial scans done while inpatient had a lymph node biopsy performed by interventional radiology. Your workup thus far is suspicious for malignancy and you have requested disccharge to follow up with [facility] for the remainder of your care. You were seen by Dr. Ba from Oncology and received Denosumab 120mg.  You are having your discs of imaging given to you or are picking them up for your use in the future.

## 2020-01-17 NOTE — PROGRESS NOTE ADULT - SUBJECTIVE AND OBJECTIVE BOX
INTERVAL HISTORY:  	echo complete    MEDICATIONS:  loratadine 10 milliGRAM(s) Oral every 24 hours PRN    naproxen 250 milliGRAM(s) Oral every 12 hours PRN    enoxaparin Injectable 40 milliGRAM(s) SubCutaneous every 24 hours        PHYSICAL EXAM:  T(C): 37.2 (01-16-20 @ 20:45), Max: 37.2 (01-16-20 @ 20:45)  HR: 92 (01-16-20 @ 20:45) (92 - 92)  BP: 131/89 (01-16-20 @ 20:45) (131/89 - 131/89)  RR: 17 (01-16-20 @ 20:45) (17 - 17)  SpO2: 97% (01-16-20 @ 20:45) (97% - 97%)  Wt(kg): --  I&O's Summary    16 Jan 2020 07:01  -  17 Jan 2020 07:00  --------------------------------------------------------  IN: 700 mL / OUT: 0 mL / NET: 700 mL          Appearance: Normal	  HEENT:   Normal oral mucosa, PERRL, EOMI	Hoarseness  Lymphatic: No lymphadenopathy  Cardiovascular: Normal S1 S2, No JVD, No murmurs, No edema  Respiratory: Lungs clear to auscultation	  Psychiatry: A & O x 3, Mood & affect appropriate  Skin: No rashes, No ecchymoses, No cyanosis  Extremities:  No clubbing, cyanosis or edema  Vascular: Peripheral pulses palpable 2+ bilaterally    TELEMETRY: 	    ECG:  	  RADIOLOGY:   DIAGNOSTIC TESTING:  [ ] Echocardiogram: Entirely normal, no effusion  [ ]  Catheterization:  [ ] Stress Test:    OTHER: 	    LABS:	 	    CARDIAC MARKERS:                                  12.8   5.81  )-----------( 193      ( 16 Jan 2020 06:37 )             40.9     01-16    141  |  104  |  11  ----------------------------<  91  3.8   |  25  |  0.81    Ca    9.4      16 Jan 2020 06:37  Phos  3.9     01-16  Mg     1.8     01-16    TPro  7.0  /  Alb  3.8  /  TBili  0.3  /  DBili  x   /  AST  38  /  ALT  43  /  AlkPhos  108  01-16    proBNP:   Lipid Profile:   HgA1c:   TSH:     ASSESSMENT/PLAN:

## 2020-01-17 NOTE — DISCHARGE NOTE NURSING/CASE MANAGEMENT/SOCIAL WORK - PATIENT PORTAL LINK FT
You can access the FollowMyHealth Patient Portal offered by [facility] by registering at the following website: [email or web address]. By joining [facility]’s FollowMyHealth portal, you will also be able to view your health information using other applications (apps) compatible with our system.

## 2020-01-17 NOTE — PROGRESS NOTE ADULT - SUBJECTIVE AND OBJECTIVE BOX
Neurology Follow up note    Name  NELDA VELAZQUEZ    HPI:  24M with no PMH presenting with 10 months of neck/left shoulder pain accompanied with neck mass, and 3 week history of hoarseness and dry cough. He says that the pain is dull and worsens on change of position. It starts at the neck and spreads down to his left shoulder. He denies any numbness, tingling, or loss of strength and sensation in his arm and hand. He also notes chest tightness especially over his left upper chest. He rates the pain as 6/10 in severity and says it comes and goes and sometimes keeps him up at night. He has seen multiple doctors for his symptoms including orthopedics and his primary care. His symptoms were controlled by muscle relaxers as well as aleve, Advil and Tylenol which he notes that he has been taking one of each daily in the last 3 weeks. He reports that it helps alleviate his pain for a short time. His hoarseness and cough which started a few weeks ago was attributed to a URI but his symptoms didn't resolve. He says his cough has become less frequent and his hoarseness still persists. He denies any dysphagia or odynophagia.  The patient went to see an orthopedic surgeon last week who ordered an MRI of the cervical spine which was consistent with malignant infiltration and homogeneous Infiltration of the bones of the cervical spine with no evidence of cord compression. He has family history of malignancies in his paternal grandmother and aunt with origins he cannot recall. In the past few weeks, he reports low grade fevers, intermittent chills and night sweats with no change in appetite or unintentional weight loss.     ED Course:  Vitals: T 97.9F  /92 RR 20 O2 98% RA  Labs: Wbc 5.27 Hb 14.0 Plt 200 AST 54 ALT 56 ESR 35 CRP 0.58 Flu neg, RSV neg  CXR Sclerosis of the C7 vertebral body with irregularity of the superior endplate, left upper lobe pulmonary nodule and fullness in the left hilar region  CT A/P: Destructive and presumed metastatic lesion of C7 with mild height loss. Marrow involvement of both C7 and T1 with ventral cortical erosion of C7-T2 from prevertebral tumor. Epidural and neural foraminal extension of tumor with partial encasement of the left vertebral artery. Multiple liver lesions. Enlarged portacaval, periportal and upper retroperitoneal nodes. Malignant tumor noted in the anterior mediastinum. Trace pericardial effusion. Metastatic left supraclavicular adenopathy. Bony metastases.  Given 1L NS in the ED (15 Anselmo 2020 12:05)      Interval History - horsiness and left trapezius weakness        REVIEW OF SYSTEMS    Vital Signs Last 24 Hrs  T(C): 36.8 (17 Jan 2020 07:14), Max: 37.2 (16 Jan 2020 20:45)  T(F): 98.3 (17 Jan 2020 07:14), Max: 99 (16 Jan 2020 20:45)  HR: 79 (17 Jan 2020 07:14) (79 - 92)  BP: 134/74 (17 Jan 2020 07:14) (131/89 - 134/74)  BP(mean): --  RR: 16 (17 Jan 2020 07:14) (16 - 17)  SpO2: 96% (17 Jan 2020 07:14) (96% - 97%)    Physical Exam-     Mental Status- awake and alert    Cranial Nerves- worseness and left trapezius weakness     Gait and station- nl    Motor- weak proximal LUE     Reflexes- intact    Sensation- no sensory level    Coordination- no tremors    Vascular - no bruits    Medications  Advil PM 2 Capsule(s) 2 Capsule(s) Oral at bedtime PRN  enoxaparin Injectable 40 milliGRAM(s) SubCutaneous every 24 hours  loratadine 10 milliGRAM(s) Oral every 24 hours PRN  naproxen 250 milliGRAM(s) Oral every 12 hours PRN      Lab      Radiology    Assessment- Multiple mets - as per oncology     Lower CN dysfunction

## 2020-01-17 NOTE — PROGRESS NOTE ADULT - SUBJECTIVE AND OBJECTIVE BOX
The patient feels about as well as he did on admission.  The patient had a PET/CT scan yesterday.  He presently is refusing the placement of a port and refusing biopsy for cytometric testing.  The patient was seen yesterday and examined and for some reason my progress note was not entered into the medical record.    The patient's vital signs are within normal limits.  Examination of the head ears eyes nose and throat demonstrates no abnormality.  Examination of the neck reveals lymphedema of the anterior lower neck and palpable 2 x 3 cm lymphadenopathy in the left supraclavicular area. The lung sounds are coarse in the left lung field although there are no forced expiratory wheezes bilaterally.  The heart rate is regular with no auscultatory evidence for murmur, rub or gallop.  The patient has excessive truncal obesity but there is no palpable or percussible hepatomegaly or splenomegaly.  The patient has no infraclavicular, axillary, epitrochlear, periumbilical or inguinal lymphadenopathy.  The patient has no chronic venous stasis changes in the lower extremities.  Neurologically there is no gross abnormality and higher cortical, cerebellar, motor or sensory functions.  The patient has no rash.

## 2020-01-22 LAB — SURGICAL PATHOLOGY STUDY: SIGNIFICANT CHANGE UP

## 2020-01-24 DIAGNOSIS — C77.0 SECONDARY AND UNSPECIFIED MALIGNANT NEOPLASM OF LYMPH NODES OF HEAD, FACE AND NECK: ICD-10-CM

## 2020-01-24 DIAGNOSIS — Z91.011 ALLERGY TO MILK PRODUCTS: ICD-10-CM

## 2020-01-24 DIAGNOSIS — C77.2 SECONDARY AND UNSPECIFIED MALIGNANT NEOPLASM OF INTRA-ABDOMINAL LYMPH NODES: ICD-10-CM

## 2020-01-24 DIAGNOSIS — L24.5 IRRITANT CONTACT DERMATITIS DUE TO OTHER CHEMICAL PRODUCTS: ICD-10-CM

## 2020-01-24 DIAGNOSIS — I31.3 PERICARDIAL EFFUSION (NONINFLAMMATORY): ICD-10-CM

## 2020-01-24 DIAGNOSIS — C79.52 SECONDARY MALIGNANT NEOPLASM OF BONE MARROW: ICD-10-CM

## 2020-01-24 DIAGNOSIS — Z80.9 FAMILY HISTORY OF MALIGNANT NEOPLASM, UNSPECIFIED: ICD-10-CM

## 2020-01-24 DIAGNOSIS — C78.7 SECONDARY MALIGNANT NEOPLASM OF LIVER AND INTRAHEPATIC BILE DUCT: ICD-10-CM

## 2020-01-24 DIAGNOSIS — C79.51 SECONDARY MALIGNANT NEOPLASM OF BONE: ICD-10-CM

## 2020-01-24 DIAGNOSIS — T50.905A ADVERSE EFFECT OF UNSPECIFIED DRUGS, MEDICAMENTS AND BIOLOGICAL SUBSTANCES, INITIAL ENCOUNTER: ICD-10-CM

## 2020-01-24 DIAGNOSIS — C38.1 MALIGNANT NEOPLASM OF ANTERIOR MEDIASTINUM: ICD-10-CM

## 2023-09-29 NOTE — DISCHARGE NOTE PROVIDER - NSDCCPGOAL_GEN_ALL_CORE_FT
To get better and follow your care plan as instructed. Deterioration of Condition En Route/Death or Disability/Increased Pain/Transportation Risk (There is always a risk of traffic delays resulting in deterioration of condition.)